# Patient Record
Sex: FEMALE | Race: WHITE | Employment: OTHER | URBAN - METROPOLITAN AREA
[De-identification: names, ages, dates, MRNs, and addresses within clinical notes are randomized per-mention and may not be internally consistent; named-entity substitution may affect disease eponyms.]

---

## 2017-01-09 ENCOUNTER — HOSPITAL ENCOUNTER (OUTPATIENT)
Dept: MAMMOGRAPHY | Facility: HOSPITAL | Age: 67
Discharge: HOME | End: 2017-01-09
Attending: FAMILY MEDICINE
Payer: MEDICARE

## 2017-01-09 DIAGNOSIS — R92.8 ABNORMAL MAMMOGRAM: ICD-10-CM

## 2017-01-09 PROCEDURE — 77065 DX MAMMO INCL CAD UNI: CPT

## 2017-04-10 RX ORDER — DICYCLOMINE HYDROCHLORIDE 10 MG/1
CAPSULE ORAL
Qty: 60 CAPSULE | Refills: 0 | Status: SHIPPED | OUTPATIENT
Start: 2017-04-10 | End: 2017-11-05

## 2017-04-10 NOTE — TELEPHONE ENCOUNTER
GI RN staff: Please contact the patient. I have refilled the prescription ×1. She should be seen in the office in follow-up for further refills.

## 2017-04-10 NOTE — TELEPHONE ENCOUNTER
From: Walt Armenta  To:  Ange Jacques MD  Sent: 4/10/2017 1:40 PM CDT  Subject: Medication Renewal Request    Original authorizing provider: MD Walt Worthington would like a refill of the following medications:  Dicyclomin

## 2017-04-11 ENCOUNTER — HOSPITAL ENCOUNTER (OUTPATIENT)
Dept: MAMMOGRAPHY | Facility: HOSPITAL | Age: 67
Discharge: HOME | End: 2017-04-11
Attending: FAMILY MEDICINE
Payer: MEDICARE

## 2017-04-11 DIAGNOSIS — R92.8 ABNORMAL MAMMOGRAM: ICD-10-CM

## 2017-04-11 PROCEDURE — 77065 DX MAMMO INCL CAD UNI: CPT | Performed by: RADIOLOGY

## 2017-04-11 NOTE — TELEPHONE ENCOUNTER
[facility] [employer] - Medicare Part D - [id number]  [phone] formulary exception    They are faxing us form now    Pt messaged via [facility] to call office to schedule appt

## 2017-04-14 NOTE — TELEPHONE ENCOUNTER
Received approval fax from [facility]. Effective 1/13/17-4/13/18  [id number]    Approval fax sent for scanning.  Pharmacy notified of approval.

## 2017-04-26 ENCOUNTER — HOSPITAL ENCOUNTER (OUTPATIENT)
Dept: GENERAL RADIOLOGY | Facility: HOSPITAL | Age: 67
Discharge: HOME | End: 2017-04-26
Attending: FAMILY MEDICINE
Payer: MEDICARE

## 2017-04-26 DIAGNOSIS — M25.551 RIGHT HIP PAIN: ICD-10-CM

## 2017-04-26 PROCEDURE — 73502 X-RAY EXAM HIP UNI 2-3 VIEWS: CPT

## 2017-05-15 ENCOUNTER — TELEPHONE (OUTPATIENT)
Dept: GASTROENTEROLOGY | Facility: CLINIC | Age: 67
End: 2017-05-15

## 2017-05-15 ENCOUNTER — OFFICE VISIT (OUTPATIENT)
Dept: GASTROENTEROLOGY | Facility: CLINIC | Age: 67
End: 2017-05-15

## 2017-05-15 VITALS
BODY MASS INDEX: 22.71 KG/M2 | SYSTOLIC BLOOD PRESSURE: 120 MMHG | HEIGHT: 64 IN | HEART RATE: 67 BPM | DIASTOLIC BLOOD PRESSURE: 70 MMHG | WEIGHT: 133 LBS

## 2017-05-15 DIAGNOSIS — K21.9 GASTROESOPHAGEAL REFLUX DISEASE WITHOUT ESOPHAGITIS: Primary | ICD-10-CM

## 2017-05-15 PROCEDURE — G0463 HOSPITAL OUTPT CLINIC VISIT: HCPCS | Performed by: INTERNAL MEDICINE

## 2017-05-15 PROCEDURE — 99213 OFFICE O/P EST LOW 20 MIN: CPT | Performed by: INTERNAL MEDICINE

## 2017-05-15 RX ORDER — CYCLOSPORINE 0.5 MG/ML
EMULSION OPHTHALMIC
Refills: 2 | COMMUNITY
Start: 2017-04-05 | End: 2019-09-25 | Stop reason: ALTCHOICE

## 2017-05-15 RX ORDER — ATORVASTATIN CALCIUM 10 MG/1
TABLET, FILM COATED ORAL
Refills: 3 | COMMUNITY
Start: 2017-05-06 | End: 2021-01-20

## 2017-05-15 RX ORDER — VALACYCLOVIR HYDROCHLORIDE 500 MG/1
TABLET, FILM COATED ORAL
Refills: 3 | COMMUNITY
Start: 2017-03-15 | End: 2019-09-25 | Stop reason: ALTCHOICE

## 2017-05-15 NOTE — PROGRESS NOTES
HPI:    Patient ID: Diamond Castro is a 78year old female. HPI  Yessy Dupont was last seen in June 2015. As per previous notes she has a long-standing history of gastroesophageal reflux that had been controlled with Nexium.  In the spring of 2014 she presente helped with dicyclomine as needed. She has noted no bleeding. The patient is not taking calcium supplementation as she was told of the possibility of an increased risk of lung cancer. She is taking vitamin D and drinks almond milk daily.   She is due to exudate. Eyes: Conjunctivae are normal. No scleral icterus. Neck: Neck supple. No thyromegaly present. Cardiovascular: Normal rate, regular rhythm and normal heart sounds.     Pulmonary/Chest: Effort normal and breath sounds normal. No respiratory dis

## 2017-05-15 NOTE — TELEPHONE ENCOUNTER
Scheduled for:  Colonoscopy 44092  Provider Name:DR. CROWLEY  Date:  6-28-17  Location: [facility]    Sedation:  MAC SEDATION  Time:  1115 AM Lafabricece Asp 1015 AM  Prep: [place] PREP AND GATORADE  Meds/Allergies Reconciled?:  CODEINE  Diagnosis with codes:  FAMILY HX.OF C

## 2017-05-15 NOTE — PATIENT INSTRUCTIONS
1.  Schedule colonoscopy for a family history of colon cancer and a history of colon polyps following a MiraLAX/Gatorade preparation and monitored anesthesia care. 2.  Continue current regimen of Zegerid and famotidine (Pepcid AC) for reflux.     3.  Onel Brown

## 2017-06-14 ENCOUNTER — HOSPITAL ENCOUNTER (OUTPATIENT)
Dept: BONE DENSITY | Facility: HOSPITAL | Age: 67
Discharge: HOME | End: 2017-06-14
Attending: FAMILY MEDICINE
Payer: MEDICARE

## 2017-06-14 DIAGNOSIS — M81.0 OSTEOPOROSIS: ICD-10-CM

## 2017-06-14 PROCEDURE — 77080 DXA BONE DENSITY AXIAL: CPT | Performed by: FAMILY MEDICINE

## 2017-06-28 ENCOUNTER — HOSPITAL ENCOUNTER (OUTPATIENT)
Facility: HOSPITAL | Age: 67
Setting detail: HOSPITAL OUTPATIENT SURGERY
Discharge: HOME | End: 2017-06-28
Attending: INTERNAL MEDICINE | Admitting: INTERNAL MEDICINE
Payer: MEDICARE

## 2017-06-28 ENCOUNTER — SURGERY (OUTPATIENT)
Age: 67
End: 2017-06-28

## 2017-06-28 ENCOUNTER — ANESTHESIA EVENT (OUTPATIENT)
Dept: ENDOSCOPY | Facility: HOSPITAL | Age: 67
End: 2017-06-28
Payer: MEDICARE

## 2017-06-28 ENCOUNTER — ANESTHESIA (OUTPATIENT)
Dept: ENDOSCOPY | Facility: HOSPITAL | Age: 67
End: 2017-06-28
Payer: MEDICARE

## 2017-06-28 DIAGNOSIS — K57.90 DIVERTICULOSIS: Primary | ICD-10-CM

## 2017-06-28 PROCEDURE — 0DJD8ZZ INSPECTION OF LOWER INTESTINAL TRACT, VIA NATURAL OR ARTIFICIAL OPENING ENDOSCOPIC: ICD-10-PCS | Performed by: INTERNAL MEDICINE

## 2017-06-28 RX ORDER — LIDOCAINE HYDROCHLORIDE 10 MG/ML
INJECTION, SOLUTION EPIDURAL; INFILTRATION; INTRACAUDAL; PERINEURAL AS NEEDED
Status: DISCONTINUED | OUTPATIENT
Start: 2017-06-28 | End: 2017-06-28 | Stop reason: SURG

## 2017-06-28 RX ORDER — SODIUM CHLORIDE, SODIUM LACTATE, POTASSIUM CHLORIDE, CALCIUM CHLORIDE 600; 310; 30; 20 MG/100ML; MG/100ML; MG/100ML; MG/100ML
INJECTION, SOLUTION INTRAVENOUS CONTINUOUS PRN
Status: DISCONTINUED | OUTPATIENT
Start: 2017-06-28 | End: 2017-06-28 | Stop reason: SURG

## 2017-06-28 RX ORDER — SODIUM CHLORIDE, SODIUM LACTATE, POTASSIUM CHLORIDE, CALCIUM CHLORIDE 600; 310; 30; 20 MG/100ML; MG/100ML; MG/100ML; MG/100ML
INJECTION, SOLUTION INTRAVENOUS CONTINUOUS
Status: DISCONTINUED | OUTPATIENT
Start: 2017-06-28 | End: 2017-06-28

## 2017-06-28 RX ORDER — NALOXONE HYDROCHLORIDE 0.4 MG/ML
80 INJECTION, SOLUTION INTRAMUSCULAR; INTRAVENOUS; SUBCUTANEOUS AS NEEDED
Status: DISCONTINUED | OUTPATIENT
Start: 2017-06-28 | End: 2017-06-28

## 2017-06-28 RX ORDER — ONDANSETRON 2 MG/ML
4 INJECTION INTRAMUSCULAR; INTRAVENOUS ONCE AS NEEDED
Status: DISCONTINUED | OUTPATIENT
Start: 2017-06-28 | End: 2017-06-28

## 2017-06-28 RX ADMIN — LIDOCAINE HYDROCHLORIDE 50 MG: 10 INJECTION, SOLUTION EPIDURAL; INFILTRATION; INTRACAUDAL; PERINEURAL at 11:48:00

## 2017-06-28 RX ADMIN — SODIUM CHLORIDE, SODIUM LACTATE, POTASSIUM CHLORIDE, CALCIUM CHLORIDE: 600; 310; 30; 20 INJECTION, SOLUTION INTRAVENOUS at 11:45:00

## 2017-06-28 RX ADMIN — SODIUM CHLORIDE, SODIUM LACTATE, POTASSIUM CHLORIDE, CALCIUM CHLORIDE: 600; 310; 30; 20 INJECTION, SOLUTION INTRAVENOUS at 12:26:00

## 2017-06-28 NOTE — OPERATIVE REPORT
[facility] Endoscopy Report      Date of Procedure:  06/28/17      Preoperative Diagnosis:  1. Personal history of adenomatous colon polyps  2.   Family history of colon cancer      Postoperative Diagnosis:  Colonic diverticulosis      Proc colonic polyps, mass lesions, vascular anomalies or signs of inflammation seen. Retroflexion in the rectum revealed no abnormalities. The procedure was well tolerated without immediate complication. Impression:  1.   Colonic diverticulosis as describ

## 2017-06-28 NOTE — ANESTHESIA PREPROCEDURE EVALUATION
Anesthesia PreOp Note    HPI:     Matthews Schaumann is a 78year old female who presents for preoperative consultation requested by: Rola Prince MD    Date of Surgery: 6/28/2017    Procedure(s):  COLONOSCOPY  Indication: Family history of colon can MG Oral Tab Take 20 mg by mouth nightly as needed for Heartburn (take 2-20 mg nightly).  Disp:  Rfl:  6/27/2017   Cyanocobalamin 3000 MCG Oral Cap NERVIDOX (unknown strength) Disp:  Rfl:  6/26/2017   Rosuvastatin Calcium (CRESTOR) 5 MG Oral Tab Take 2.5 mg No history of anesthetic complications   Airway   Mallampati: I  TM distance: >3 FB  Neck ROM: full  Dental - normal exam     Pulmonary - negative ROS and normal exam   Cardiovascular - negative ROS and normal exam    Neuro/Psych - negative ROS     GI/Hepa

## 2017-06-28 NOTE — H&P
History & Physical Examination    Patient Name: Sanchez Pickett  MRN: [id number]  CSN: [id number]  YOB: 1950    Diagnosis: Personal history of adenomatous polyps and family history of colon cancer        Prescriptions Prior to Admission:  PEG HISTORY      Comment: per NG \"skin from vulva precancerous\"-please                specify  2014: UPPER GI ENDOSCOPY,BIOPSY  Family History   Problem Relation Age of Onset   • Heart Disease Father    • Colon Cancer Mother    • Hypertension Mother    • Can

## 2017-06-28 NOTE — ANESTHESIA POSTPROCEDURE EVALUATION
Patient:  Marilee Cho    Procedure Summary     Date:  06/28/17 Room / Location:  [address] [place] [address] ENDOSCOPY 01 / [address] [place] [address] ENDOSCOPY    Anesthesia Start:  2585 Anesthesia Stop:      Procedure:  COLONOSCOPY (N/A ) Diagnosis:       Family history of colon cancer      [place]

## 2017-06-29 VITALS
OXYGEN SATURATION: 100 % | SYSTOLIC BLOOD PRESSURE: 144 MMHG | HEIGHT: 63 IN | BODY MASS INDEX: 23.74 KG/M2 | RESPIRATION RATE: 21 BRPM | DIASTOLIC BLOOD PRESSURE: 66 MMHG | HEART RATE: 59 BPM | WEIGHT: 134 LBS

## 2017-10-24 ENCOUNTER — HOSPITAL ENCOUNTER (OUTPATIENT)
Dept: MAMMOGRAPHY | Facility: HOSPITAL | Age: 67
Discharge: HOME | End: 2017-10-24
Attending: FAMILY MEDICINE
Payer: MEDICARE

## 2017-10-24 DIAGNOSIS — R92.8 ABNORMAL MAMMOGRAM: ICD-10-CM

## 2017-10-24 PROCEDURE — 77066 DX MAMMO INCL CAD BI: CPT | Performed by: FAMILY MEDICINE

## 2017-11-06 RX ORDER — DICYCLOMINE HYDROCHLORIDE 10 MG/1
CAPSULE ORAL
Qty: 60 CAPSULE | Refills: 0 | Status: SHIPPED | OUTPATIENT
Start: 2017-11-06 | End: 2018-02-22

## 2017-11-06 NOTE — TELEPHONE ENCOUNTER
Pending Prescriptions Disp Refills    DICYCLOMINE HCL 10 MG Oral Cap [Pharmacy Med Name: DICYCLOMINE 10MG CAPSULES] 60 capsule 0     Sig: TAKE 1 TO 2 CAPSULES BY MOUTH FOUR TIMES DAILY BEFORE MEALS AND AT BEDTIME AS NEEDED         See refill request  Carli Rodarte

## 2018-02-23 NOTE — TELEPHONE ENCOUNTER
From: Estefany Cameron  Sent: 2/22/2018 6:50 PM CST  Subject: Medication Renewal Request    Fidelina Parker would like a refill of the following medications:     DICYCLOMINE HCL 10 MG Oral Cap Rajni Trujillo, APN]   Patient Comment: I have a formulary exc

## 2018-02-23 NOTE — TELEPHONE ENCOUNTER
Pending Prescriptions Disp Refills    Dicyclomine HCl 10 MG Oral Cap 60 capsule 0       See refill request  Patient last seen 5-15-17.    Medication last refilled 11-6-17

## 2018-02-24 RX ORDER — DICYCLOMINE HYDROCHLORIDE 10 MG/1
10 CAPSULE ORAL 4 TIMES DAILY PRN
Qty: 60 CAPSULE | Refills: 1 | Status: SHIPPED
Start: 2018-02-24 | End: 2019-01-13

## 2018-08-06 ENCOUNTER — TELEPHONE (OUTPATIENT)
Dept: GASTROENTEROLOGY | Facility: CLINIC | Age: 68
End: 2018-08-06

## 2018-08-06 NOTE — TELEPHONE ENCOUNTER
Current Outpatient Prescriptions:  Dicyclomine HCl 10 MG Oral Cap Take 1 capsule (10 mg total) by mouth 4 (four) times daily as needed.  Disp: 60 capsule Rfl: 1     PA request call [phone] Pt ID# [id number]

## 2018-08-07 NOTE — TELEPHONE ENCOUNTER
Submitted PA through CoverMyMeds with Key: [id number]    Should get a determination from [employer] within 3-5 days.

## 2018-08-09 NOTE — TELEPHONE ENCOUNTER
Contacted [facility] at [phone], option 3, and spoke to Makenzie. Provided her with the additional information needed and she stated that they will fax over the determination when finished.

## 2018-08-09 NOTE — TELEPHONE ENCOUNTER
malvin- from  [address] [place] [address] [place]  Option 3    Reference number [id number]     [facility] Requesting additional information needs to verify diagnoses of pt.        Current Outpatient Prescriptions:   •  Dicyclomine HCl 10 MG Oral Cap, Take 1 capsule (10 mg total)

## 2018-08-13 NOTE — TELEPHONE ENCOUNTER
Nursing: per Dr. Jeferson Richardson last OV note, pt is on Bentyl PRN for fecal urgency for which she has been stable/responding well for the last year.  She does not have over diarrhea or constipation and as such the below Medicare provided recommendations wou

## 2018-08-13 NOTE — TELEPHONE ENCOUNTER
Received fax from [employer]. The PA for dicyclomine was denied. Denial reason is \"Medicare rules require you must have a FDA approved diagnosis. ..  The recommended drugs covered by your plan may include: Xifaxan, loperamide, polyethylene glycol

## 2018-08-15 NOTE — TELEPHONE ENCOUNTER
We will go ahead and start the appeal process. [facility] - could you please write a letter that I can submit along with the appeal paperwork? Thank you.

## 2018-08-20 NOTE — TELEPHONE ENCOUNTER
Faxed denial letter, letter from [employer], and last OV note to [facility] at [phone] to start appeal.    Should hear a response within 7 days.

## 2018-09-05 NOTE — TELEPHONE ENCOUNTER
Approval for Dicycl[facility] 10 [facility] Member ID #[id number]  Certification #[id number]    Sent to Scanning    Called [facility] to notify them it as approved    Patient notified as well.

## 2018-10-31 ENCOUNTER — HOSPITAL ENCOUNTER (OUTPATIENT)
Dept: MAMMOGRAPHY | Facility: HOSPITAL | Age: 68
Discharge: HOME | End: 2018-10-31
Attending: FAMILY MEDICINE
Payer: MEDICARE

## 2018-10-31 DIAGNOSIS — Z12.39 ENCOUNTER FOR SCREENING FOR MALIGNANT NEOPLASM OF BREAST: ICD-10-CM

## 2018-10-31 PROCEDURE — 77067 SCR MAMMO BI INCL CAD: CPT | Performed by: FAMILY MEDICINE

## 2018-10-31 PROCEDURE — 77063 BREAST TOMOSYNTHESIS BI: CPT | Performed by: FAMILY MEDICINE

## 2019-01-03 ENCOUNTER — HOSPITAL ENCOUNTER (OUTPATIENT)
Dept: ULTRASOUND IMAGING | Facility: HOSPITAL | Age: 69
Discharge: HOME | End: 2019-01-03
Attending: FAMILY MEDICINE
Payer: MEDICARE

## 2019-01-03 DIAGNOSIS — R92.2 DENSE BREAST: ICD-10-CM

## 2019-01-03 PROCEDURE — 76641 ULTRASOUND BREAST COMPLETE: CPT | Performed by: FAMILY MEDICINE

## 2019-01-13 ENCOUNTER — PATIENT MESSAGE (OUTPATIENT)
Dept: GASTROENTEROLOGY | Facility: CLINIC | Age: 69
End: 2019-01-13

## 2019-01-14 RX ORDER — DICYCLOMINE HYDROCHLORIDE 10 MG/1
10 CAPSULE ORAL 4 TIMES DAILY PRN
Qty: 60 CAPSULE | Refills: 1 | Status: SHIPPED | OUTPATIENT
Start: 2019-01-14 | End: 2019-03-11

## 2019-01-14 NOTE — TELEPHONE ENCOUNTER
From: Johnathan Connolly  To: Rah Garcia MD  Sent: 1/13/2019 3:32 PM CST  Subject: Prescription Question    I have sent a request for a refill of Dicylomine 10mg to [facility]. My last refill was in August. I still take it as needed. Thank you.   Bertram Webster

## 2019-01-14 NOTE — TELEPHONE ENCOUNTER
Requested Prescriptions     Pending Prescriptions Disp Refills   • Dicyclomine HCl 10 MG Oral Cap 60 capsule 1     Sig: Take 1 capsule (10 mg total) by mouth 4 (four) times daily as needed.      LOV-6/28/17  LR-2/22/18

## 2019-01-14 NOTE — TELEPHONE ENCOUNTER
Nursing: Patient has not been seen in office and >1-year. Would recommend a nonurgent annual follow-up with either myself or Dr. Vanessa Vázquez per patient preference. Rx filled.

## 2019-01-16 NOTE — TELEPHONE ENCOUNTER
Spoke to pt to get schedule.  Scheduled for 1/28 with [facility]

## 2019-02-18 NOTE — PROGRESS NOTES
[facility] Follow-up Visit    Celsa Etoh  - Denies illicit drug use   - LMP: Postmenopausal  - Occupation: Retired  -    - NSAIDs/ASA use: PRN    History, Medications, Allergies, ROS:      Past Medical History:   Diagnosis Date   • Dysplasia of vagina 2015    surgery done   • Esophag mg by mouth nightly as needed for Heartburn (take 2-20 mg nightly). Disp:  Rfl:    Cyanocobalamin 3000 MCG Oral Cap NERVIDOX (unknown strength) Disp:  Rfl:    Coenzyme Q10 (CO Q-10) 200 MG Oral Cap Take  by mouth.  Disp:  Rfl:    Cholecalciferol (VITAMIN D- movements of all 4 extremities   Psych: Pt has a normal mood and affect, behavior is normal    Nursing note and vitals reviewed      Labs/Imaging:     Patient's labs and imaging were reviewed and discussed with patient today.   See HPI and A&P for further d

## 2019-03-11 ENCOUNTER — OFFICE VISIT (OUTPATIENT)
Dept: GASTROENTEROLOGY | Facility: CLINIC | Age: 69
End: 2019-03-11
Payer: MEDICARE

## 2019-03-11 VITALS
HEART RATE: 67 BPM | SYSTOLIC BLOOD PRESSURE: 115 MMHG | BODY MASS INDEX: 21.7 KG/M2 | WEIGHT: 124 LBS | DIASTOLIC BLOOD PRESSURE: 73 MMHG | HEIGHT: 63.5 IN

## 2019-03-11 DIAGNOSIS — K21.9 GASTROESOPHAGEAL REFLUX DISEASE, ESOPHAGITIS PRESENCE NOT SPECIFIED: ICD-10-CM

## 2019-03-11 DIAGNOSIS — R10.9 ABDOMINAL CRAMPING: Primary | ICD-10-CM

## 2019-03-11 PROCEDURE — 99213 OFFICE O/P EST LOW 20 MIN: CPT | Performed by: NURSE PRACTITIONER

## 2019-03-11 PROCEDURE — G0463 HOSPITAL OUTPT CLINIC VISIT: HCPCS | Performed by: NURSE PRACTITIONER

## 2019-03-11 RX ORDER — OMEPRAZOLE/SODIUM BICARBONATE 20MG-1.1G
20 CAPSULE ORAL
COMMUNITY

## 2019-03-11 RX ORDER — DICYCLOMINE HYDROCHLORIDE 10 MG/1
10 CAPSULE ORAL 4 TIMES DAILY PRN
Qty: 60 CAPSULE | Refills: 1 | Status: SHIPPED | OUTPATIENT
Start: 2019-03-11 | End: 2019-08-14

## 2019-03-11 NOTE — PATIENT INSTRUCTIONS
1.  Continue Bentyl as needed  2. Continue omeprazole/Pepcid as needed for heartburn  3.   Follow-up in 1 year or sooner if new issues arise

## 2019-08-15 RX ORDER — DICYCLOMINE HYDROCHLORIDE 10 MG/1
CAPSULE ORAL
Qty: 60 CAPSULE | Refills: 0 | Status: SHIPPED | OUTPATIENT
Start: 2019-08-15 | End: 2019-11-06

## 2019-08-15 NOTE — TELEPHONE ENCOUNTER
Requested Prescriptions     Pending Prescriptions Disp Refills   • DICYCLOMINE HCL 10 MG Oral Cap [Pharmacy Med Name: DICYCLOMINE 10MG CAPSULES] 60 capsule 0     Sig: TAKE 1 CAPSULE(10 MG) BY MOUTH FOUR TIMES DAILY AS NEEDED     lov-3/11/19  lr-3/11/19

## 2019-09-18 NOTE — PROGRESS NOTES
[facility] Follow-up Visit    Celsa breath.           Pertinent Surgical Hx:  Cholecystectomy  - See additional surgical hx below  - No known issues with sedation.  - No known history of sleep apnea.     Pertinent Family Hx:  + Colon cancer, mother (diagnosed age 80)  - No family hx of esopha (Other) Paternal Grandfather    • Ovarian Cancer Paternal Aunt 71      Social History: Social History    Tobacco Use      Smoking status: Never Smoker      Smokeless tobacco: Never Used    Alcohol use: Yes      Comment: 2 glasses of wine monthly    Drug us swelling  BEHAVIOR/PSYCH:  negative for depressed mood    PHYSICAL EXAM:   Blood pressure 144/88, pulse 69, temperature 98.2 °F (36.8 °C), temperature source Oral, resp. rate 20, height 5' 3.5\" (1.613 m), weight 129 lb (58.5 kg).     Gen: patient appears c possible the patient's symptoms last month may have been viral in origin and follow-up symptoms could be a postinfectious irritable bowel.   Pending lab findings, consideration for cautious observation, change in probiotic to a non-dairy-containing product,

## 2019-09-25 ENCOUNTER — OFFICE VISIT (OUTPATIENT)
Dept: GASTROENTEROLOGY | Facility: CLINIC | Age: 69
End: 2019-09-25
Payer: MEDICARE

## 2019-09-25 VITALS
WEIGHT: 129 LBS | DIASTOLIC BLOOD PRESSURE: 88 MMHG | SYSTOLIC BLOOD PRESSURE: 144 MMHG | HEART RATE: 69 BPM | TEMPERATURE: 98 F | RESPIRATION RATE: 20 BRPM | HEIGHT: 63.5 IN | BODY MASS INDEX: 22.57 KG/M2

## 2019-09-25 DIAGNOSIS — R19.4 ALTERED BOWEL HABITS: Primary | ICD-10-CM

## 2019-09-25 DIAGNOSIS — K21.9 GASTROESOPHAGEAL REFLUX DISEASE, ESOPHAGITIS PRESENCE NOT SPECIFIED: ICD-10-CM

## 2019-09-25 PROCEDURE — G0463 HOSPITAL OUTPT CLINIC VISIT: HCPCS | Performed by: NURSE PRACTITIONER

## 2019-09-25 PROCEDURE — 99214 OFFICE O/P EST MOD 30 MIN: CPT | Performed by: NURSE PRACTITIONER

## 2019-09-25 NOTE — PATIENT INSTRUCTIONS
1.  Complete stool testing-C. difficile testing can only be done on loose/diarrheal stools  2. Consider changing probiotic to a non-milk-containing product  3. Consider a fiber supplement and continue bland diet  4. Increase omeprazole to twice daily.

## 2019-10-09 ENCOUNTER — LAB ENCOUNTER (OUTPATIENT)
Dept: LAB | Facility: HOSPITAL | Age: 69
End: 2019-10-09
Attending: NURSE PRACTITIONER
Payer: MEDICARE

## 2019-10-09 DIAGNOSIS — R19.4 ALTERED BOWEL HABITS: ICD-10-CM

## 2019-10-09 PROCEDURE — 87329 GIARDIA AG IA: CPT

## 2019-10-09 PROCEDURE — 87077 CULTURE AEROBIC IDENTIFY: CPT

## 2019-10-09 PROCEDURE — 87493 C DIFF AMPLIFIED PROBE: CPT

## 2019-10-09 PROCEDURE — 87427 SHIGA-LIKE TOXIN AG IA: CPT

## 2019-10-09 PROCEDURE — 87045 FECES CULTURE AEROBIC BACT: CPT

## 2019-10-09 PROCEDURE — 87046 STOOL CULTR AEROBIC BACT EA: CPT

## 2019-10-09 PROCEDURE — 87272 CRYPTOSPORIDIUM AG IF: CPT

## 2019-11-06 RX ORDER — DICYCLOMINE HYDROCHLORIDE 10 MG/1
CAPSULE ORAL
Qty: 60 CAPSULE | Refills: 2 | Status: SHIPPED | OUTPATIENT
Start: 2019-11-06 | End: 2020-06-08

## 2019-11-06 NOTE — TELEPHONE ENCOUNTER
Nursing: Per my last office visit note we planned for short-term office follow-up to rediscuss the patient's symptomatic response and adjust medications at that time. I would recommend a nonurgent follow-up. Rx has been sent.

## 2019-11-13 ENCOUNTER — HOSPITAL ENCOUNTER (OUTPATIENT)
Dept: MAMMOGRAPHY | Facility: HOSPITAL | Age: 69
Discharge: HOME | End: 2019-11-13
Attending: FAMILY MEDICINE
Payer: MEDICARE

## 2019-11-13 ENCOUNTER — TELEPHONE (OUTPATIENT)
Dept: GASTROENTEROLOGY | Facility: CLINIC | Age: 69
End: 2019-11-13

## 2019-11-13 DIAGNOSIS — Z12.31 ENCOUNTER FOR SCREENING MAMMOGRAM FOR MALIGNANT NEOPLASM OF BREAST: ICD-10-CM

## 2019-11-13 PROCEDURE — 77063 BREAST TOMOSYNTHESIS BI: CPT | Performed by: FAMILY MEDICINE

## 2019-11-13 PROCEDURE — 77067 SCR MAMMO BI INCL CAD: CPT | Performed by: FAMILY MEDICINE

## 2019-11-13 NOTE — TELEPHONE ENCOUNTER
Medication PA Requested: Dicyclomine 10 mg caps  Pt insurance/number to contact: [email or web address] [phone]  CoverMyMeds Used:  Yes  Key: [address] ID# and group: [id number]  Dx.: Abdominal cramping R10.9  Medications tried previously with callie

## 2019-11-13 NOTE — TELEPHONE ENCOUNTER
Current Outpatient Medications   Medication Sig Dispense Refill   • DICYCLOMINE HCL 10 MG Oral Cap TAKE 1 CAPSULE(10 MG) BY MOUTH FOUR TIMES DAILY AS NEEDED 60 capsule 2     Per pharmacy - plan does not cover this med.   Please call plan at [phone] to

## 2019-12-02 NOTE — PROGRESS NOTES
[facility] Follow-up Visit    Celsa Stathopoulos with MAC related to family history of colon cancer, history of adenomatous colon polyps, findings: Colonic diverticulosis, recommendations: Based on prior history of 2 subcentimeter adenomatous/no colon polyps on endoscopy with a difficult: Re Medications   Medication Sig Dispense Refill   • Lactobacillus-Inulin (Klutch[employer] DIGESTIVE HEALTH) Oral Cap      • DICYCLOMINE HCL 10 MG Oral Cap TAKE 1 CAPSULE(10 MG) BY MOUTH FOUR TIMES DAILY AS NEEDED 60 capsule 2   • Omeprazole-Sodium Bicarbonate (ZEG appear moist  CV: regular rate and rhythm, the extremities are warm and well perfused   Lung: effort normal and breath sounds normal, no respiratory distress, wheezes or rales  GI: soft, non-tender exam in all quadrants without rigidity or guarding, non-di This Visit:  Requested Prescriptions      No prescriptions requested or ordered in this encounter       Imaging & Referrals:  None       BIANCA Garza  12/9/2019

## 2019-12-09 ENCOUNTER — OFFICE VISIT (OUTPATIENT)
Dept: GASTROENTEROLOGY | Facility: CLINIC | Age: 69
End: 2019-12-09
Payer: MEDICARE

## 2019-12-09 VITALS
WEIGHT: 128 LBS | DIASTOLIC BLOOD PRESSURE: 69 MMHG | SYSTOLIC BLOOD PRESSURE: 114 MMHG | BODY MASS INDEX: 22.4 KG/M2 | HEART RATE: 73 BPM | HEIGHT: 63.5 IN

## 2019-12-09 DIAGNOSIS — R19.4 ALTERED BOWEL HABITS: Primary | ICD-10-CM

## 2019-12-09 DIAGNOSIS — K21.9 GASTROESOPHAGEAL REFLUX DISEASE, ESOPHAGITIS PRESENCE NOT SPECIFIED: ICD-10-CM

## 2019-12-09 PROCEDURE — 99213 OFFICE O/P EST LOW 20 MIN: CPT | Performed by: NURSE PRACTITIONER

## 2019-12-09 PROCEDURE — G0463 HOSPITAL OUTPT CLINIC VISIT: HCPCS | Performed by: NURSE PRACTITIONER

## 2019-12-09 RX ORDER — LACTOBACILLUS RHAMNOSUS GG 10B CELL
CAPSULE ORAL
COMMUNITY
Start: 2019-11-01

## 2019-12-09 NOTE — PATIENT INSTRUCTIONS
1.  Continue Zegerid twice daily then cut back to daily  2. Continue famotidine nightly  3.   Follow-up annually or sooner if new issues arise

## 2020-05-07 ENCOUNTER — OFFICE VISIT (OUTPATIENT)
Dept: GASTROENTEROLOGY | Facility: CLINIC | Age: 70
End: 2020-05-07
Payer: MEDICARE

## 2020-05-07 ENCOUNTER — APPOINTMENT (OUTPATIENT)
Dept: LAB | Facility: HOSPITAL | Age: 70
End: 2020-05-07
Attending: INTERNAL MEDICINE
Payer: MEDICARE

## 2020-05-07 VITALS
DIASTOLIC BLOOD PRESSURE: 83 MMHG | HEIGHT: 63.5 IN | HEART RATE: 62 BPM | BODY MASS INDEX: 22.23 KG/M2 | SYSTOLIC BLOOD PRESSURE: 130 MMHG | WEIGHT: 127 LBS

## 2020-05-07 DIAGNOSIS — R19.7 DIARRHEA, UNSPECIFIED TYPE: Primary | ICD-10-CM

## 2020-05-07 DIAGNOSIS — R19.7 DIARRHEA, UNSPECIFIED TYPE: ICD-10-CM

## 2020-05-07 DIAGNOSIS — K58.0 IRRITABLE BOWEL SYNDROME WITH DIARRHEA: ICD-10-CM

## 2020-05-07 PROCEDURE — 99213 OFFICE O/P EST LOW 20 MIN: CPT | Performed by: INTERNAL MEDICINE

## 2020-05-07 PROCEDURE — 36415 COLL VENOUS BLD VENIPUNCTURE: CPT

## 2020-05-07 PROCEDURE — 83516 IMMUNOASSAY NONANTIBODY: CPT

## 2020-05-07 PROCEDURE — G0463 HOSPITAL OUTPT CLINIC VISIT: HCPCS | Performed by: INTERNAL MEDICINE

## 2020-05-07 PROCEDURE — 82784 ASSAY IGA/IGD/IGG/IGM EACH: CPT

## 2020-05-07 NOTE — PATIENT INSTRUCTIONS
1.  Resume Culturelle probiotic. 2.  Obtain blood work for celiac disease  3. Further advice pending the above results.

## 2020-05-07 NOTE — PROGRESS NOTES
HPI:    Patient ID: Sesar Wolfe is a 78year old female. HPI  The patient returns in follow-up today to discuss chronic intermittent diarrhea.   She was last seen by myself at the time of colonoscopy in June 2017 and most recently by BIANCA Song in D CAPSULE(10 MG) BY MOUTH FOUR TIMES DAILY AS NEEDED 60 capsule 2   • Omeprazole-Sodium Bicarbonate (ZEGERID)  MG Oral Cap Take 20 mg by mouth 2 (two) times daily.        • Zolpidem Tartrate (AMBIEN) 5 MG Oral Tab Only when traveling   0   • famoTIDine and vitals reviewed. ASSESSMENT/PLAN:   Diarrhea, unspecified type  (primary encounter diagnosis)  Irritable bowel syndrome with diarrhea  The patient has a longstanding history of intermittent diarrhea described as urgent and looser stools.   I

## 2020-06-08 ENCOUNTER — TELEPHONE (OUTPATIENT)
Dept: GASTROENTEROLOGY | Facility: CLINIC | Age: 70
End: 2020-06-08

## 2020-06-08 RX ORDER — DICYCLOMINE HYDROCHLORIDE 10 MG/1
CAPSULE ORAL
Qty: 60 CAPSULE | Refills: 2 | Status: SHIPPED | OUTPATIENT
Start: 2020-06-08 | End: 2020-12-14

## 2020-06-08 NOTE — TELEPHONE ENCOUNTER
Requested Prescriptions     Pending Prescriptions Disp Refills   • DICYCLOMINE HCL 10 MG Oral Cap [Pharmacy Med Name: DICYCLOMINE 10MG CAPSULES] 60 capsule 2     Sig: TAKE 1 CAPSULE(10 MG) BY MOUTH FOUR TIMES DAILY AS NEEDED     LOV: 5/7/2020 with Dr. Wendy Arndt

## 2020-06-09 RX ORDER — NORTRIPTYLINE HYDROCHLORIDE 10 MG/1
10 CAPSULE ORAL NIGHTLY
Qty: 30 CAPSULE | Refills: 2 | Status: SHIPPED | OUTPATIENT
Start: 2020-06-09 | End: 2020-09-03

## 2020-06-10 NOTE — TELEPHONE ENCOUNTER
Dr. Nena Rogers for one month and has helped but has had 10x diarrhea in the last month. Patient is requesting you call her regarding next steps. Not willing to give RN further details.      If needed I can schedule a tele/video vis
I spoke to Troy Cabello. Symptoms as below. The symptoms are intermittent and the patient may have several days of no symptoms. Constipating agents such as Imodium or colestipol would likely be problematic. I am recommending a trial of a low-dose tricyclic.   I
Patient requesting to speak with Dr Jory Spencer to update him - states she is doing a little better but would like to discuss. Please call [phone]. Thank you.
Statement Selected

## 2020-09-03 ENCOUNTER — HOSPITAL ENCOUNTER (OUTPATIENT)
Dept: GENERAL RADIOLOGY | Facility: HOSPITAL | Age: 70
Discharge: HOME | End: 2020-09-03
Attending: FAMILY MEDICINE
Payer: MEDICARE

## 2020-09-03 DIAGNOSIS — M25.572 CHRONIC PAIN OF LEFT ANKLE: ICD-10-CM

## 2020-09-03 DIAGNOSIS — G89.29 CHRONIC PAIN OF LEFT ANKLE: ICD-10-CM

## 2020-09-03 DIAGNOSIS — G89.29 CHRONIC FOOT PAIN, LEFT: ICD-10-CM

## 2020-09-03 DIAGNOSIS — M79.672 CHRONIC FOOT PAIN, LEFT: ICD-10-CM

## 2020-09-03 PROCEDURE — 73610 X-RAY EXAM OF ANKLE: CPT | Performed by: FAMILY MEDICINE

## 2020-09-03 PROCEDURE — 73630 X-RAY EXAM OF FOOT: CPT | Performed by: FAMILY MEDICINE

## 2020-09-04 RX ORDER — NORTRIPTYLINE HYDROCHLORIDE 10 MG/1
10 CAPSULE ORAL NIGHTLY
Qty: 30 CAPSULE | Refills: 2 | Status: SHIPPED | OUTPATIENT
Start: 2020-09-04 | End: 2021-11-02

## 2020-10-09 ENCOUNTER — TELEPHONE (OUTPATIENT)
Dept: GASTROENTEROLOGY | Facility: CLINIC | Age: 70
End: 2020-10-09

## 2020-10-09 NOTE — TELEPHONE ENCOUNTER
Ludivina Fisher    Received letter from Jennifer Mariee about potential drug safety concern\" multiple anticholinergic medications. Placed on your desk for review.     Thank you

## 2020-10-12 NOTE — TELEPHONE ENCOUNTER
Documents are noted and appreciated. The patient may continue with nortriptyline and dicyclomine as prescribed. Nursing: Do we need to fill out any of this paperwork?   It appears that the only listed paperwork with signatures would be in the event if t

## 2020-10-12 NOTE — TELEPHONE ENCOUNTER
[address] office received this fax. It appears that it is just an informative fax that generates by the patient's pharmaceutical provider. There is no need to sign anything.   We are supposed to scan documents received about a patient under our care i

## 2020-11-17 ENCOUNTER — HOSPITAL ENCOUNTER (OUTPATIENT)
Dept: MAMMOGRAPHY | Facility: HOSPITAL | Age: 70
Discharge: HOME | End: 2020-11-17
Attending: FAMILY MEDICINE
Payer: MEDICARE

## 2020-11-17 DIAGNOSIS — Z12.31 ENCOUNTER FOR SCREENING MAMMOGRAM FOR HIGH-RISK PATIENT: ICD-10-CM

## 2020-11-17 PROCEDURE — 77067 SCR MAMMO BI INCL CAD: CPT | Performed by: FAMILY MEDICINE

## 2020-11-17 PROCEDURE — 77063 BREAST TOMOSYNTHESIS BI: CPT | Performed by: FAMILY MEDICINE

## 2020-11-25 RX ORDER — NORTRIPTYLINE HYDROCHLORIDE 10 MG/1
10 CAPSULE ORAL NIGHTLY
Qty: 90 CAPSULE | Refills: 1 | Status: SHIPPED
Start: 2020-11-25 | End: 2021-01-05

## 2020-11-25 NOTE — TELEPHONE ENCOUNTER
Current Outpatient Medications   Medication Sig Dispense Refill   • Nortriptyline HCl 10 MG Oral Cap Take 1 capsule (10 mg total) by mouth nightly.  30 capsule 2

## 2020-11-25 NOTE — TELEPHONE ENCOUNTER
Dr. Stanislaw Meyer    Patient requesting refill of below medication. Please review and sign below pended prescription of appropriate.     Thank you    LOV 5/7/2020  LR 9/4/2020    Nortriptyline HCl 10 MG Oral Cap 30 capsule 2 9/4/2020    Sig:   Take 1 capsul

## 2020-12-02 NOTE — PROGRESS NOTES
[facility] Follow-up Visit    Celsa observation vs alternative screening unless new symptoms or signs are noted     June 2014 EGD performed by Dr. Deacon Miramontes with IV Twilight related to reflux, findings: Fundic gland polyps, mild erythema     Social Hx:  - No smoking  + Occasional Etoh HCl 10 MG Oral Cap Take 1 capsule (10 mg total) by mouth nightly. 90 capsule 1   • Nortriptyline HCl 10 MG Oral Cap Take 1 capsule (10 mg total) by mouth nightly. 30 capsule 2   • ZINC OR Use as directed in the mouth or throat once a week.      • Lactobacil appears anicteric, oropharynx clear, mucus membranes appear moist  CV: regular rate and rhythm, the extremities are warm and well perfused   Lung: effort normal and breath sounds normal, no respiratory distress, wheezes or rales  GI: soft, non-tender exam reasonable. Plan for follow-up in 1 year or sooner if new issues arise.       Recommend:  -Continue Zegerid OTC daily  -Continue nortriptyline 10 mg nightly  -Continue dicyclomine as needed  -Follow-up in 1 year or sooner if new issues arise      Orders Th

## 2020-12-09 PROCEDURE — 87624 HPV HI-RISK TYP POOLED RSLT: CPT | Performed by: CLINICAL MEDICAL LABORATORY

## 2020-12-09 PROCEDURE — 88175 CYTOPATH C/V AUTO FLUID REDO: CPT | Performed by: CLINICAL MEDICAL LABORATORY

## 2020-12-09 PROCEDURE — 88112 CYTOPATH CELL ENHANCE TECH: CPT | Performed by: CLINICAL MEDICAL LABORATORY

## 2020-12-10 ENCOUNTER — LAB REQUISITION (OUTPATIENT)
Dept: LAB | Age: 70
End: 2020-12-10

## 2020-12-10 DIAGNOSIS — Z12.4 ENCOUNTER FOR SCREENING FOR MALIGNANT NEOPLASM OF CERVIX: ICD-10-CM

## 2020-12-10 DIAGNOSIS — D07.1 CARCINOMA IN SITU OF VULVA: ICD-10-CM

## 2020-12-14 ENCOUNTER — OFFICE VISIT (OUTPATIENT)
Dept: GASTROENTEROLOGY | Facility: CLINIC | Age: 70
End: 2020-12-14
Payer: MEDICARE

## 2020-12-14 VITALS
WEIGHT: 130 LBS | HEIGHT: 63 IN | BODY MASS INDEX: 23.04 KG/M2 | HEART RATE: 77 BPM | SYSTOLIC BLOOD PRESSURE: 110 MMHG | DIASTOLIC BLOOD PRESSURE: 72 MMHG

## 2020-12-14 DIAGNOSIS — K58.0 IRRITABLE BOWEL SYNDROME WITH DIARRHEA: ICD-10-CM

## 2020-12-14 DIAGNOSIS — K21.9 GASTROESOPHAGEAL REFLUX DISEASE, UNSPECIFIED WHETHER ESOPHAGITIS PRESENT: Primary | ICD-10-CM

## 2020-12-14 LAB
ASR DISCLAIMER: NORMAL
CASE RPRT: NORMAL
CLINICAL INFO: NORMAL
PATH REPORT.FINAL DX SPEC: NORMAL
PATH REPORT.GROSS SPEC: NORMAL

## 2020-12-14 PROCEDURE — G0463 HOSPITAL OUTPT CLINIC VISIT: HCPCS | Performed by: NURSE PRACTITIONER

## 2020-12-14 PROCEDURE — 99214 OFFICE O/P EST MOD 30 MIN: CPT | Performed by: NURSE PRACTITIONER

## 2020-12-14 RX ORDER — OMEPRAZOLE AND SODIUM BICARBONATE 40; 1100 MG/1; MG/1
1 CAPSULE ORAL
Qty: 90 CAPSULE | Refills: 0 | Status: SHIPPED | OUTPATIENT
Start: 2020-12-14 | End: 2021-01-20

## 2020-12-14 RX ORDER — DICYCLOMINE HYDROCHLORIDE 10 MG/1
10 CAPSULE ORAL 4 TIMES DAILY PRN
Qty: 60 CAPSULE | Refills: 2 | Status: SHIPPED | OUTPATIENT
Start: 2020-12-14 | End: 2021-12-15

## 2020-12-14 NOTE — PATIENT INSTRUCTIONS
-Continue Zegerid OTC daily  -Continue nortriptyline 10 mg nightly  -Continue dicyclomine as needed  -Follow-up in 1 year or sooner if new issues arise

## 2020-12-15 LAB
CASE RPRT: NORMAL
CYTOLOGY CVX/VAG STUDY: NORMAL
CYTOLOGY CVX/VAG STUDY: NORMAL
HPV16+18+45 E6+E7MRNA CVX NAA+PROBE: NEGATIVE
Lab: NORMAL
PAP EDUCATIONAL NOTE: NORMAL
SPECIMEN ADEQUACY: NORMAL

## 2020-12-16 LAB
HPV16+18+45 E6+E7MRNA CVX NAA+PROBE: NEGATIVE
Lab: NORMAL
SERVICE CMNT-IMP: NORMAL

## 2020-12-18 ENCOUNTER — TELEPHONE (OUTPATIENT)
Dept: GASTROENTEROLOGY | Facility: CLINIC | Age: 70
End: 2020-12-18

## 2020-12-21 ENCOUNTER — TELEPHONE (OUTPATIENT)
Dept: GASTROENTEROLOGY | Facility: CLINIC | Age: 70
End: 2020-12-21

## 2020-12-21 ENCOUNTER — PATIENT MESSAGE (OUTPATIENT)
Dept: GASTROENTEROLOGY | Facility: CLINIC | Age: 70
End: 2020-12-21

## 2020-12-21 NOTE — TELEPHONE ENCOUNTER
Drug Change request      Drug:  Omeprazol 40mg/SOD BICARB 1100 mg  Qty:90    Sig:  Take 1 Capsule by mouth every morning before breakfast  No current outpatient medications on file.

## 2020-12-22 ENCOUNTER — TELEPHONE (OUTPATIENT)
Dept: GASTROENTEROLOGY | Facility: CLINIC | Age: 70
End: 2020-12-22

## 2020-12-22 NOTE — TELEPHONE ENCOUNTER
If the symptoms are controlled with the omeprazole alone, we do not need to add sodium bicarbonate. If the patient wishes to continue to use Zegerid, may use the goodRx coupon.

## 2020-12-22 NOTE — TELEPHONE ENCOUNTER
From: Traci Camilo  To: BAILEY Balbuena  Sent: 12/21/2020 11:53 AM CST  Subject: Prescription Question    Temi,  When I saw you on the 14th we talked about refills on Dicyclomine 10mg and one for Zegrid which I can no longer find over the counter

## 2020-12-22 NOTE — TELEPHONE ENCOUNTER
Dr. Tejinder White    Patient trying to fill zegrid prescription since she has trouble finding it OTC since the pandemic. It is very expensive.   The pharmacist recommended splitting up the components for a cheaper price (take 20mg omeprazole and 1100mg of s

## 2020-12-22 NOTE — TELEPHONE ENCOUNTER
Per pharmacist, they have no information on this request.  Patient never seen by our GI doctors. Message disregarded.

## 2020-12-22 NOTE — TELEPHONE ENCOUNTER
Medication PA Requested: Dicyclomine HCl 10 MG Oral Cap                   CoverMyMeds Used:  Yes  Key:[id number]  Sig: Take 1 capsule (10 mg total) by mouth 4 (four) times daily as needed  DX Code: IBS-D                                       PA submitted with

## 2020-12-22 NOTE — TELEPHONE ENCOUNTER
PA request for Zegrid submitted through Covermymeds. com  Await response. Rony Patel (Adeline Mulligan)    Your information has been submitted to [employer]. [employer] is reviewing the PA request and you will receive an electronic response.  You may c

## 2020-12-22 NOTE — TELEPHONE ENCOUNTER
Pharmacist states patient's insurance does not cover combination drug Omeprazole and Sodium Bicarb. The will only cover if written in two separate prescriptions or plain Omeprazole. Please advise. Thank you.

## 2020-12-23 NOTE — TELEPHONE ENCOUNTER
Patient contacted and message from Dr. Wandy Orta given. Patient states she feels the Becker Chuck works better for her and would like to stay on it. She will try using the GoodRx coupon at the pharmacy.

## 2020-12-23 NOTE — TELEPHONE ENCOUNTER
Per TERESA alvares request is not reviewable by Jennifer Mariee. This is the number listed below ([employer] pharmacy help desk) that we received by pharmacy.      Contacted [employer] help desk at [phone] and spoke with [employer]

## 2020-12-23 NOTE — TELEPHONE ENCOUNTER
Our prior authorization request for David Aguilar has been denied-received fax from [employer].   \" Patient must have tried and failed 2 other drugs   -esomeprazole  -lansoprazole  -omeprazole  -pantoprazole\"     Per below documentation from [facility] pat

## 2020-12-23 NOTE — TELEPHONE ENCOUNTER
Heather Andrade    I spoke with the pharmacist at [facility] aware dicyclomine is authorized and will fill. The Zegerid 40-1100mg will not work with [employer] coupon. The coupon is only good for Zegerid (the otc dose) 20-1100mg.   Please advise on alternate prescr

## 2020-12-28 NOTE — TELEPHONE ENCOUNTER
Nursing: May disregard my below message. There is a separate MyChart message related to the same topic.

## 2020-12-28 NOTE — TELEPHONE ENCOUNTER
Nursing: Please contact the patient with the below information relating insurance company for the omeprazole-sodium bicarbonate. I believe she was attempting to obtain this medication over-the-counter as well so she may not need this prescription.   If she

## 2021-01-05 ENCOUNTER — IMMUNIZATION (OUTPATIENT)
Dept: LAB | Facility: HOSPITAL | Age: 71
End: 2021-01-05
Attending: PREVENTIVE MEDICINE
Payer: MEDICARE

## 2021-01-05 DIAGNOSIS — Z23 NEED FOR VACCINATION: ICD-10-CM

## 2021-01-05 PROCEDURE — 0011A SARSCOV2 VAC 100MCG/0.5ML IM: CPT

## 2021-01-05 RX ORDER — NORTRIPTYLINE HYDROCHLORIDE 10 MG/1
10 CAPSULE ORAL NIGHTLY
Qty: 90 CAPSULE | Refills: 3 | Status: SHIPPED | OUTPATIENT
Start: 2021-01-05 | End: 2021-11-15

## 2021-01-05 NOTE — TELEPHONE ENCOUNTER
Requested Prescriptions     Pending Prescriptions Disp Refills   • Nortriptyline HCl 10 MG Oral Cap 90 capsule 1     Sig: Take 1 capsule (10 mg total) by mouth nightly.      lov-5/7/20  lr-11/25/20

## 2021-01-06 NOTE — TELEPHONE ENCOUNTER
Contacted [employer] help desk at [phone] and spoke with [facility]. Dicyclomine approved through 8/22/2020-12/21/2021. Contacted [facility] to be sure that patient has been able to receive prescription. Patient picked up 30 day supply 12/26/2020.

## 2021-01-18 ENCOUNTER — OFFICE VISIT (OUTPATIENT)
Dept: GASTROENTEROLOGY | Facility: CLINIC | Age: 71
End: 2021-01-18
Payer: MEDICARE

## 2021-01-18 ENCOUNTER — TELEPHONE (OUTPATIENT)
Dept: GASTROENTEROLOGY | Facility: CLINIC | Age: 71
End: 2021-01-18

## 2021-01-18 VITALS
TEMPERATURE: 97 F | SYSTOLIC BLOOD PRESSURE: 120 MMHG | HEIGHT: 63 IN | HEART RATE: 73 BPM | DIASTOLIC BLOOD PRESSURE: 67 MMHG | WEIGHT: 128 LBS | BODY MASS INDEX: 22.68 KG/M2

## 2021-01-18 DIAGNOSIS — K21.9 GASTROESOPHAGEAL REFLUX DISEASE WITHOUT ESOPHAGITIS: ICD-10-CM

## 2021-01-18 DIAGNOSIS — K21.9 GASTROESOPHAGEAL REFLUX DISEASE WITHOUT ESOPHAGITIS: Primary | ICD-10-CM

## 2021-01-18 DIAGNOSIS — K31.7 GASTRIC POLYPS: ICD-10-CM

## 2021-01-18 DIAGNOSIS — Z87.19 HISTORY OF GASTRIC POLYP: Primary | ICD-10-CM

## 2021-01-18 PROCEDURE — 99213 OFFICE O/P EST LOW 20 MIN: CPT | Performed by: INTERNAL MEDICINE

## 2021-01-18 RX ORDER — PREDNISOLONE ACETATE 10 MG/ML
SUSPENSION/ DROPS OPHTHALMIC
COMMUNITY
Start: 2020-11-04 | End: 2021-01-20

## 2021-01-18 NOTE — TELEPHONE ENCOUNTER
I called and left a voicemail message for patient to onel me back and letting me know if Tues 3/09/2021 @ 11:30 is acceptable for EGD.     Transfer to ext [phone]

## 2021-01-18 NOTE — TELEPHONE ENCOUNTER
Scheduled for:  [facility] [id number]  Provider Name: Dr Rico Yancey   Date: [place] 3/09/2021   Location: [facility]  Sedation: MAC   Time: 11:30 am   Prep:  Nothing after midnight the day before procedure and NPO 4 hrs prior procedure    Meds/Allergies Reconciled?:  Reviewed

## 2021-01-18 NOTE — PATIENT INSTRUCTIONS
Schedule upper endoscopy for history of gastroesophageal reflux and gastric polyps with monitored anesthesia care

## 2021-01-20 ENCOUNTER — LAB ENCOUNTER (OUTPATIENT)
Dept: LAB | Age: 71
End: 2021-01-20
Attending: FAMILY MEDICINE
Payer: MEDICARE

## 2021-01-20 ENCOUNTER — OFFICE VISIT (OUTPATIENT)
Dept: FAMILY MEDICINE CLINIC | Facility: CLINIC | Age: 71
End: 2021-01-20
Payer: MEDICARE

## 2021-01-20 VITALS
WEIGHT: 126.81 LBS | HEART RATE: 69 BPM | TEMPERATURE: 97 F | SYSTOLIC BLOOD PRESSURE: 107 MMHG | HEIGHT: 63 IN | DIASTOLIC BLOOD PRESSURE: 71 MMHG | BODY MASS INDEX: 22.47 KG/M2

## 2021-01-20 DIAGNOSIS — Z86.010 HISTORY OF COLON POLYPS: ICD-10-CM

## 2021-01-20 DIAGNOSIS — R79.9 ABNORMAL BLOOD CHEMISTRY: ICD-10-CM

## 2021-01-20 DIAGNOSIS — E78.2 MIXED HYPERLIPIDEMIA: Primary | ICD-10-CM

## 2021-01-20 DIAGNOSIS — K21.9 GASTROESOPHAGEAL REFLUX DISEASE WITHOUT ESOPHAGITIS: ICD-10-CM

## 2021-01-20 DIAGNOSIS — R53.83 FATIGUE, UNSPECIFIED TYPE: ICD-10-CM

## 2021-01-20 DIAGNOSIS — M81.0 OSTEOPOROSIS, UNSPECIFIED OSTEOPOROSIS TYPE, UNSPECIFIED PATHOLOGICAL FRACTURE PRESENCE: ICD-10-CM

## 2021-01-20 DIAGNOSIS — E78.2 MIXED HYPERLIPIDEMIA: ICD-10-CM

## 2021-01-20 LAB
ALBUMIN SERPL-MCNC: 3.8 G/DL (ref 3.4–5)
ALBUMIN/GLOB SERPL: 1 {RATIO} (ref 1–2)
ALP LIVER SERPL-CCNC: 116 U/L
ALT SERPL-CCNC: 27 U/L
ANION GAP SERPL CALC-SCNC: 7 MMOL/L (ref 0–18)
AST SERPL-CCNC: 19 U/L (ref 15–37)
BILIRUB SERPL-MCNC: 0.6 MG/DL (ref 0.1–2)
BUN BLD-MCNC: 11 MG/DL (ref 7–18)
BUN/CREAT SERPL: 13.1 (ref 10–20)
CALCIUM BLD-MCNC: 9.4 MG/DL (ref 8.5–10.1)
CHLORIDE SERPL-SCNC: 105 MMOL/L (ref 98–112)
CHOLEST SMN-MCNC: 146 MG/DL (ref ?–200)
CO2 SERPL-SCNC: 30 MMOL/L (ref 21–32)
CREAT BLD-MCNC: 0.84 MG/DL
DEPRECATED RDW RBC AUTO: 43.3 FL (ref 35.1–46.3)
ERYTHROCYTE [DISTWIDTH] IN BLOOD BY AUTOMATED COUNT: 12.4 % (ref 11–15)
GLOBULIN PLAS-MCNC: 4 G/DL (ref 2.8–4.4)
GLUCOSE BLD-MCNC: 81 MG/DL (ref 70–99)
HCT VFR BLD AUTO: 41.3 %
HDLC SERPL-MCNC: 59 MG/DL (ref 40–59)
HGB BLD-MCNC: 13.4 G/DL
LDLC SERPL CALC-MCNC: 66 MG/DL (ref ?–100)
M PROTEIN MFR SERPL ELPH: 7.8 G/DL (ref 6.4–8.2)
MCH RBC QN AUTO: 31.2 PG (ref 26–34)
MCHC RBC AUTO-ENTMCNC: 32.4 G/DL (ref 31–37)
MCV RBC AUTO: 96.3 FL
NONHDLC SERPL-MCNC: 87 MG/DL (ref ?–130)
OSMOLALITY SERPL CALC.SUM OF ELEC: 292 MOSM/KG (ref 275–295)
PATIENT FASTING Y/N/NP: YES
PATIENT FASTING Y/N/NP: YES
PLATELET # BLD AUTO: 270 10(3)UL (ref 150–450)
POTASSIUM SERPL-SCNC: 4.4 MMOL/L (ref 3.5–5.1)
RBC # BLD AUTO: 4.29 X10(6)UL
SODIUM SERPL-SCNC: 142 MMOL/L (ref 136–145)
TRIGL SERPL-MCNC: 105 MG/DL (ref 30–149)
TSI SER-ACNC: 2.52 MIU/ML (ref 0.36–3.74)
VLDLC SERPL CALC-MCNC: 21 MG/DL (ref 0–30)
WBC # BLD AUTO: 5.7 X10(3) UL (ref 4–11)

## 2021-01-20 PROCEDURE — 80061 LIPID PANEL: CPT

## 2021-01-20 PROCEDURE — 99203 OFFICE O/P NEW LOW 30 MIN: CPT | Performed by: FAMILY MEDICINE

## 2021-01-20 PROCEDURE — 80053 COMPREHEN METABOLIC PANEL: CPT

## 2021-01-20 PROCEDURE — 36415 COLL VENOUS BLD VENIPUNCTURE: CPT

## 2021-01-20 PROCEDURE — 85027 COMPLETE CBC AUTOMATED: CPT

## 2021-01-20 PROCEDURE — 84443 ASSAY THYROID STIM HORMONE: CPT

## 2021-01-20 NOTE — PROGRESS NOTES
HPI:    Patient ID: Traci Camilo is a 69year old female. HPI  David Canchola returns in follow-up. She was last seen by BIANCA Balbuena on December 14, 2020 and by myself in May 2020.     As per previous notes David Canchola has a very longstanding history of inter 10 mg dose at 8 PM each night.     The patient received her first dose of the Moderna coronavirus vaccine earlier this month as she is a volunteer at the hospital.       Review of Systems  See above    Wt Readings from Last 6 Encounters:  01/20/21 : 126 lb No scleral icterus. Neck: Neck supple. No thyromegaly present. Cardiovascular: Normal rate, regular rhythm and normal heart sounds. Pulmonary/Chest: Effort normal and breath sounds normal. No respiratory distress. She has no wheezes.  She has no rales

## 2021-01-20 NOTE — PROGRESS NOTES
Johnathan Connolly is a 69year old female. Patient presents with:  Establish Care: former patient of Dr. Darrell Li      HPI:   Patient is a 65-year-old female who presents today to establish care.   Patient has been receiving care every 6 months with her old 2      Past Medical History:   Diagnosis Date   • Dysplasia of vagina 2015    surgery done   • Esophageal reflux    • High cholesterol    • Osteoporosis    • Other and unspecified hyperlipidemia       Past Surgical History:   Procedure Laterality Date   • tolerating it well. - COMP METABOLIC PANEL (14); Future    3. Fatigue, unspecified type  We will draw TSH today. - ASSAY, THYROID STIM HORMONE; Future    4.  History of colon polyps  Patient to have an evaluation with GI in March for whether she needs a c

## 2021-02-02 ENCOUNTER — IMMUNIZATION (OUTPATIENT)
Dept: LAB | Facility: HOSPITAL | Age: 71
End: 2021-02-02
Attending: PREVENTIVE MEDICINE
Payer: MEDICARE

## 2021-02-02 DIAGNOSIS — Z23 NEED FOR VACCINATION: Primary | ICD-10-CM

## 2021-02-02 PROCEDURE — 0012A SARSCOV2 VAC 100MCG/0.5ML IM: CPT

## 2021-02-04 NOTE — TELEPHONE ENCOUNTER

## 2021-03-01 ENCOUNTER — PATIENT MESSAGE (OUTPATIENT)
Dept: GASTROENTEROLOGY | Facility: CLINIC | Age: 71
End: 2021-03-01

## 2021-03-02 NOTE — TELEPHONE ENCOUNTER
From: Kayla Sy  To: James Avalos MD  Sent: 3/1/2021 5:31 PM CST  Subject: Other    I am having an EGD on Tuesday March 9. I know I need to have a covid test I think 72 hrs prior to the EGD.  Would you send me the details on when and where I n

## 2021-03-06 ENCOUNTER — LAB ENCOUNTER (OUTPATIENT)
Dept: LAB | Facility: HOSPITAL | Age: 71
End: 2021-03-06
Attending: INTERNAL MEDICINE
Payer: MEDICARE

## 2021-03-06 DIAGNOSIS — Z01.818 PRE-OP TESTING: ICD-10-CM

## 2021-03-07 LAB — SARS-COV-2 RNA RESP QL NAA+PROBE: NOT DETECTED

## 2021-03-09 ENCOUNTER — ANESTHESIA EVENT (OUTPATIENT)
Dept: ENDOSCOPY | Facility: HOSPITAL | Age: 71
End: 2021-03-09
Payer: MEDICARE

## 2021-03-09 ENCOUNTER — HOSPITAL ENCOUNTER (OUTPATIENT)
Facility: HOSPITAL | Age: 71
Setting detail: HOSPITAL OUTPATIENT SURGERY
Discharge: HOME | End: 2021-03-09
Attending: INTERNAL MEDICINE | Admitting: INTERNAL MEDICINE
Payer: MEDICARE

## 2021-03-09 ENCOUNTER — ANESTHESIA (OUTPATIENT)
Dept: ENDOSCOPY | Facility: HOSPITAL | Age: 71
End: 2021-03-09
Payer: MEDICARE

## 2021-03-09 VITALS
RESPIRATION RATE: 20 BRPM | OXYGEN SATURATION: 98 % | BODY MASS INDEX: 22.68 KG/M2 | SYSTOLIC BLOOD PRESSURE: 147 MMHG | WEIGHT: 128 LBS | TEMPERATURE: 98 F | DIASTOLIC BLOOD PRESSURE: 70 MMHG | HEIGHT: 63 IN | HEART RATE: 66 BPM

## 2021-03-09 DIAGNOSIS — K21.9 GASTROESOPHAGEAL REFLUX DISEASE WITHOUT ESOPHAGITIS: ICD-10-CM

## 2021-03-09 DIAGNOSIS — Z87.19 HISTORY OF GASTRIC POLYP: ICD-10-CM

## 2021-03-09 DIAGNOSIS — Z01.818 PRE-OP TESTING: Primary | ICD-10-CM

## 2021-03-09 PROCEDURE — 43251 EGD REMOVE LESION SNARE: CPT | Performed by: INTERNAL MEDICINE

## 2021-03-09 PROCEDURE — 0DB68ZX EXCISION OF STOMACH, VIA NATURAL OR ARTIFICIAL OPENING ENDOSCOPIC, DIAGNOSTIC: ICD-10-PCS | Performed by: INTERNAL MEDICINE

## 2021-03-09 RX ORDER — LIDOCAINE HYDROCHLORIDE 10 MG/ML
INJECTION, SOLUTION EPIDURAL; INFILTRATION; INTRACAUDAL; PERINEURAL AS NEEDED
Status: DISCONTINUED | OUTPATIENT
Start: 2021-03-09 | End: 2021-03-09 | Stop reason: SURG

## 2021-03-09 RX ORDER — ONDANSETRON 2 MG/ML
4 INJECTION INTRAMUSCULAR; INTRAVENOUS ONCE
Status: COMPLETED | OUTPATIENT
Start: 2021-03-09 | End: 2021-03-09

## 2021-03-09 RX ORDER — SODIUM CHLORIDE, SODIUM LACTATE, POTASSIUM CHLORIDE, CALCIUM CHLORIDE 600; 310; 30; 20 MG/100ML; MG/100ML; MG/100ML; MG/100ML
INJECTION, SOLUTION INTRAVENOUS CONTINUOUS
Status: DISCONTINUED | OUTPATIENT
Start: 2021-03-09 | End: 2021-03-09

## 2021-03-09 RX ADMIN — LIDOCAINE HYDROCHLORIDE 25 MG: 10 INJECTION, SOLUTION EPIDURAL; INFILTRATION; INTRACAUDAL; PERINEURAL at 12:48:00

## 2021-03-09 RX ADMIN — SODIUM CHLORIDE, SODIUM LACTATE, POTASSIUM CHLORIDE, CALCIUM CHLORIDE: 600; 310; 30; 20 INJECTION, SOLUTION INTRAVENOUS at 13:44:00

## 2021-03-09 NOTE — H&P
History & Physical Examination    Patient Name: Jackie May  MRN: [id number]  [facility]: [id number]  YOB: 1950    Diagnosis: Gastroesophageal reflux/dyspepsia      Dicyclomine HCl 10 MG Oral Cap, Take 1 capsule (10 mg total) by mouth 4 (four) ti unspecified hyperlipidemia      Past Surgical History:   Procedure Laterality Date   • CATARACT Left 02/21/2019    implant put in per pt @ [facility]   • CATARACT Right 10/17/2019   • CHOLECYSTECTOMY  2011   • COLONOSCOPY  2012   • COLONOSCOPY N/A 6/28/2

## 2021-03-09 NOTE — OPERATIVE REPORT
[facility] Endoscopy Report      Date of Procedure:  03/09/21        Preoperative Diagnosis:  Gastroesophageal reflux and dyspepsia      Postoperative Diagnosis:  1. Multiple gastric polyps  2.  Hiatal hernia      Procedure:    Esophagogastr suction or with the aid of a retrieval net. All areas exhibited hemostasis with the exception of #1 polypectomy site which exhibited oozing of blood. This was easily controlled with the application of a solitary endoscopic clip.   The duodenal bulb and post

## 2021-03-09 NOTE — ANESTHESIA POSTPROCEDURE EVALUATION
Patient:  Lucia Kohler    Procedure Summary     Date: 03/09/21 Room / Location: [facility] ENDOSCOPY 04 / [facility] ENDOSCOPY    Anesthesia Start: 7057 Anesthesia Stop: 1555    Procedure: ESOPHAGOGASTRODUODENOSCOPY (EGD) (N/A ) Diagnosis:       History of gastric neha

## 2021-03-09 NOTE — ANESTHESIA PREPROCEDURE EVALUATION
Anesthesia PreOp Note    HPI:     Jerry Selby is a 69year old female who presents for preoperative consultation requested by: Chapincito Mendosa MD    Date of Surgery: 3/9/2021    Procedure(s):  ESOPHAGOGASTRODUODENOSCOPY (EGD)  Indication: History throat once a week., Disp: , Rfl: , 2/28/2021  Lactobacillus-Inulin ([address] [place] [address]) Oral Cap, , Disp: , Rfl: , 2/28/2021  Omeprazole-Sodium Bicarbonate (ZEGERID)  MG Oral Cap, Take 20 mg by mouth BID (Nitrates).   , Disp: , Rfl: , 3/8/2 level: Not on file    Occupational History      Not on file    Tobacco Use      Smoking status: Never Smoker      Smokeless tobacco: Never Used    Vaping Use      Vaping Use: Never used    Substance and Sexual Activity      Alcohol use: Yes        Comment: MCH 31.2 01/20/2021    MCHC 32.4 01/20/2021    RDW 12.4 01/20/2021    .0 01/20/2021     Lab Results   Component Value Date     01/20/2021    K 4.4 01/20/2021     01/20/2021    CO2 30.0 01/20/2021    BUN 11 01/20/2021    CREATSERUM 0.84 0

## 2021-03-12 ENCOUNTER — TELEPHONE (OUTPATIENT)
Dept: GASTROENTEROLOGY | Facility: CLINIC | Age: 71
End: 2021-03-12

## 2021-03-12 NOTE — TELEPHONE ENCOUNTER
Recall EGD in 1 year per Dr. Chucky Shea. Last done: 3/9/21. Next due: 3/9/22    Updated health maintenance and pt outreach.

## 2021-03-12 NOTE — TELEPHONE ENCOUNTER
----- Message from Bryson Francisco MD sent at 3/11/2021  6:26 PM CST -----  I spoke to the patient. Other than a slight sore throat she is feeling well. The polyps were fundic gland in nature. No H. pylori.   The polyps, however, were numerous and s

## 2021-04-06 ENCOUNTER — PATIENT MESSAGE (OUTPATIENT)
Dept: GASTROENTEROLOGY | Facility: CLINIC | Age: 71
End: 2021-04-06

## 2021-04-06 ENCOUNTER — PATIENT MESSAGE (OUTPATIENT)
Dept: FAMILY MEDICINE CLINIC | Facility: CLINIC | Age: 71
End: 2021-04-06

## 2021-04-06 RX ORDER — ZOLPIDEM TARTRATE 5 MG/1
5 TABLET ORAL NIGHTLY PRN
Qty: 15 TABLET | Refills: 0 | Status: SHIPPED | OUTPATIENT
Start: 2021-04-06 | End: 2021-10-14

## 2021-04-06 NOTE — TELEPHONE ENCOUNTER
Please see [employer] message and advise if office visit is needed for request. LOV= 1/20/21; zolpidem 5 mg is historically listed on med list from 2015 [place] when traveling. \"

## 2021-04-07 NOTE — TELEPHONE ENCOUNTER
From: Star   To: Tracey aCde MD  Sent: 4/6/2021 12:50 PM CDT  Subject: Other    Dr. Schwartz Host,  After my EGD in early March, you suggested I try to take Zegerid every other day from every day.  I believe this is due to the number of ga

## 2021-04-08 NOTE — TELEPHONE ENCOUNTER
Dr. Garcia Letters-    Noted! I will relay this information to the patient & notify the schedulers to reach out to the patient, so that she may be scheduled for October as requested.      Would you like the same orders as placed last time?: \"Schedule upper endosco

## 2021-04-08 NOTE — TELEPHONE ENCOUNTER
Please contact William Gonzalez. Answers to questions as follows:    1. The Zegerid was indeed decreased in an effort to lessen polyp formation. Acid lowering medications can increase the development of fundic gland polyps. 2. An EGD in October would be fine.   Larkin Gaucher

## 2021-04-22 ENCOUNTER — TELEPHONE (OUTPATIENT)
Dept: GASTROENTEROLOGY | Facility: CLINIC | Age: 71
End: 2021-04-22

## 2021-04-22 DIAGNOSIS — K31.7 GASTRIC POLYPS: ICD-10-CM

## 2021-04-22 DIAGNOSIS — Z87.19 HISTORY OF GASTROESOPHAGEAL REFLUX (GERD): Primary | ICD-10-CM

## 2021-04-22 NOTE — TELEPHONE ENCOUNTER
Schedulers-    Please assist patient in scheduling EGD for October. Okay per Dr. Mario Genao. No OV needed. \"Schedule upper endoscopy for history of gastroesophageal reflux and gastric polyps with monitored anesthesia care. \"     Dx:Gastric polyps    Thank you!

## 2021-05-11 NOTE — TELEPHONE ENCOUNTER
Scheduled for:  EGD - 11674  Provider Name:  Dr. Claudell Public  Date:  10/5/21  Location:  [facility]  Sedation:  MAC  Time:  8:00 am (pt is aware to arrive at 7:00 am)  Prep:  NPO after midnight, Prep instructions were given to pt over the phone, pt verbalized un

## 2021-06-03 ENCOUNTER — TELEPHONE (OUTPATIENT)
Dept: OTHER | Facility: HOSPITAL | Age: 71
End: 2021-06-03

## 2021-06-03 ENCOUNTER — HOSPITAL ENCOUNTER (OUTPATIENT)
Dept: ULTRASOUND IMAGING | Facility: HOSPITAL | Age: 71
Discharge: HOME | End: 2021-06-03
Attending: FAMILY MEDICINE

## 2021-06-03 DIAGNOSIS — Z13.9 ENCOUNTER FOR SCREENING: ICD-10-CM

## 2021-06-03 NOTE — PROGRESS NOTES
Called back and pv results explained and risk factor management pt had received results via my chart and denied questions

## 2021-06-16 PROCEDURE — 87624 HPV HI-RISK TYP POOLED RSLT: CPT | Performed by: CLINICAL MEDICAL LABORATORY

## 2021-06-16 PROCEDURE — 88112 CYTOPATH CELL ENHANCE TECH: CPT | Performed by: CLINICAL MEDICAL LABORATORY

## 2021-06-17 ENCOUNTER — LAB REQUISITION (OUTPATIENT)
Dept: LAB | Age: 71
End: 2021-06-17

## 2021-06-17 DIAGNOSIS — D07.1 CARCINOMA IN SITU OF VULVA: ICD-10-CM

## 2021-06-17 LAB
HPV16+18+45 E6+E7MRNA CVX NAA+PROBE: NEGATIVE
Lab: NORMAL

## 2021-06-29 ENCOUNTER — OFFICE VISIT (OUTPATIENT)
Dept: FAMILY MEDICINE CLINIC | Facility: CLINIC | Age: 71
End: 2021-06-29
Payer: MEDICARE

## 2021-06-29 VITALS
HEIGHT: 63 IN | HEART RATE: 82 BPM | SYSTOLIC BLOOD PRESSURE: 126 MMHG | DIASTOLIC BLOOD PRESSURE: 82 MMHG | WEIGHT: 128.81 LBS | TEMPERATURE: 98 F | BODY MASS INDEX: 22.82 KG/M2

## 2021-06-29 DIAGNOSIS — E78.2 MIXED HYPERLIPIDEMIA: ICD-10-CM

## 2021-06-29 DIAGNOSIS — G89.29 CHRONIC RIGHT SHOULDER PAIN: Primary | ICD-10-CM

## 2021-06-29 DIAGNOSIS — M25.511 CHRONIC RIGHT SHOULDER PAIN: Primary | ICD-10-CM

## 2021-06-29 PROCEDURE — 99213 OFFICE O/P EST LOW 20 MIN: CPT | Performed by: FAMILY MEDICINE

## 2021-06-29 NOTE — PROGRESS NOTES
Darius Ramírez is a 69year old female.   Patient presents with:  Shoulder Pain: right shoulder pain x 3 months, hx of osteoporosis, Advil provides some relief  Injection: due for TDAP today      HPI:   Patient is a 71-year-old female who presents today wi Tartrate (AMBIEN) 5 MG Oral Tab Only when traveling  (Patient not taking: Reported on 6/29/2021 )  0      Past Medical History:   Diagnosis Date   • [facility]     1970 from birth control   • Dysplasia of vagina 2015    surgery done   • Eso without murmur    EXTREMITIES: no cyanosis, or edema    ASSESSMENT AND PLAN:   1. Chronic right shoulder pain  Suggested using a topical anti-inflammatory at night. Also suggested using Advil as needed with food.   Patient will return if this is not effect

## 2021-08-04 ENCOUNTER — HOSPITAL ENCOUNTER (OUTPATIENT)
Age: 71
Discharge: HOME | End: 2021-08-04
Payer: MEDICARE

## 2021-08-04 VITALS
DIASTOLIC BLOOD PRESSURE: 56 MMHG | TEMPERATURE: 99 F | SYSTOLIC BLOOD PRESSURE: 125 MMHG | OXYGEN SATURATION: 100 % | HEART RATE: 94 BPM | RESPIRATION RATE: 18 BRPM

## 2021-08-04 DIAGNOSIS — H60.11 CELLULITIS OF EAR, RIGHT: ICD-10-CM

## 2021-08-04 DIAGNOSIS — H93.8X1: Primary | ICD-10-CM

## 2021-08-04 PROCEDURE — 99203 OFFICE O/P NEW LOW 30 MIN: CPT | Performed by: NURSE PRACTITIONER

## 2021-08-04 RX ORDER — OFLOXACIN 3 MG/ML
10 SOLUTION AURICULAR (OTIC) DAILY
Qty: 5 ML | Refills: 0 | Status: SHIPPED | OUTPATIENT
Start: 2021-08-04 | End: 2021-08-11

## 2021-08-04 RX ORDER — AMOXICILLIN AND CLAVULANATE POTASSIUM 875; 125 MG/1; MG/1
1 TABLET, FILM COATED ORAL 2 TIMES DAILY
Qty: 14 TABLET | Refills: 0 | Status: SHIPPED | OUTPATIENT
Start: 2021-08-04 | End: 2021-08-11

## 2021-08-04 NOTE — ED PROVIDER NOTES
Patient Seen in: [facility]      History   Patient presents with:  Ear Problem Pain    Stated Complaint: ear pain    HPI/Subjective:   Well-appearing 69-year-old female presents with complaints of right upper ear and head pain since yesterda except as noted above.     Physical Exam     ED Triage Vitals [08/04/21 1821]   /56   Pulse 94   Resp 18   Temp 99.4 °F (37.4 °C)   Temp src    SpO2 100 %   O2 Device None (Room air)       Current:/56   Pulse 94   Temp 99.4 °F (37.4 °C)   Resp 1 pain.     I discussed with patient that symptoms are not likely to be related to the DTaP vaccine that she received yesterday on her left arm.     General education and instructions regarding early cellulitis as well as over-the-counter/nonpharmacological t

## 2021-08-04 NOTE — ED INITIAL ASSESSMENT (HPI)
Pt here with complaints of a sharp and stabbing pain to her upper right ear, pt staets she received her tdap shot yesterday and last night started having sharp pain to her upper right ear and right head, pt denies any nausea or dizziness

## 2021-08-05 ENCOUNTER — NURSE TRIAGE (OUTPATIENT)
Dept: FAMILY MEDICINE CLINIC | Facility: CLINIC | Age: 71
End: 2021-08-05

## 2021-08-05 NOTE — TELEPHONE ENCOUNTER
Spoke with pt,  verified  Pt stated she had Tetanus shot at [facility] 2 days ago then after her shot on that day she developed a stabbing pain on her RT ear, she was unable to sleep due to pain.   Pt takes tylenol as needed  Pt stated since her sx did not get

## 2021-08-05 NOTE — TELEPHONE ENCOUNTER
Spoke with patient ( verified) and relayed Dr. [facility] Larkin message below--patient verbalizes understanding and agreement. Appt scheduled for 1 p.m., Tuesday, 8/10/2021--will go to  if symptoms worsen prior to appt.  No further questions/concerns at this ti

## 2021-08-05 NOTE — TELEPHONE ENCOUNTER
I could squeeze her in next Tues anytime between1-2:30. Pls call pt and offer this to her.  If she needs to be seen sooner I can only offer UC

## 2021-08-05 NOTE — TELEPHONE ENCOUNTER
----- Message from [address] sent at 8/4/2021  3:47 PM CDT -----  Regarding: Non-Urgent Medical Question  Contact: [phone]  I received tdap (Boostrix) yesterday at [facility]. My arm was a little sore yesterday but nothing bad.  Later at night when I w

## 2021-08-10 ENCOUNTER — OFFICE VISIT (OUTPATIENT)
Dept: FAMILY MEDICINE CLINIC | Facility: CLINIC | Age: 71
End: 2021-08-10
Payer: MEDICARE

## 2021-08-10 VITALS
HEART RATE: 95 BPM | BODY MASS INDEX: 22.54 KG/M2 | WEIGHT: 127.19 LBS | HEIGHT: 63 IN | DIASTOLIC BLOOD PRESSURE: 78 MMHG | SYSTOLIC BLOOD PRESSURE: 136 MMHG

## 2021-08-10 DIAGNOSIS — Z12.31 ENCOUNTER FOR SCREENING MAMMOGRAM FOR MALIGNANT NEOPLASM OF BREAST: ICD-10-CM

## 2021-08-10 DIAGNOSIS — H60.501 ACUTE OTITIS EXTERNA OF RIGHT EAR, UNSPECIFIED TYPE: Primary | ICD-10-CM

## 2021-08-10 PROCEDURE — 99212 OFFICE O/P EST SF 10 MIN: CPT | Performed by: FAMILY MEDICINE

## 2021-08-10 NOTE — PROGRESS NOTES
Star  is a 69year old female.   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 Called mammography at the hospital to ensure that a screening mammogram would include a 2D and 3D mammogram.  They stated it does. - [facility] SCREENING BILAT (CPT=77067); Future    2.  Acute otitis externa of r

## 2021-10-02 ENCOUNTER — LAB ENCOUNTER (OUTPATIENT)
Dept: LAB | Facility: HOSPITAL | Age: 71
End: 2021-10-02
Attending: INTERNAL MEDICINE
Payer: MEDICARE

## 2021-10-02 DIAGNOSIS — Z01.818 PRE-OP TESTING: ICD-10-CM

## 2021-10-05 ENCOUNTER — ANESTHESIA EVENT (OUTPATIENT)
Dept: ENDOSCOPY | Facility: HOSPITAL | Age: 71
End: 2021-10-05
Payer: MEDICARE

## 2021-10-05 ENCOUNTER — ANESTHESIA (OUTPATIENT)
Dept: ENDOSCOPY | Facility: HOSPITAL | Age: 71
End: 2021-10-05
Payer: MEDICARE

## 2021-10-05 ENCOUNTER — HOSPITAL ENCOUNTER (OUTPATIENT)
Facility: HOSPITAL | Age: 71
Setting detail: HOSPITAL OUTPATIENT SURGERY
Discharge: HOME | End: 2021-10-05
Attending: INTERNAL MEDICINE | Admitting: INTERNAL MEDICINE
Payer: MEDICARE

## 2021-10-05 VITALS
HEART RATE: 65 BPM | TEMPERATURE: 98 F | OXYGEN SATURATION: 98 % | BODY MASS INDEX: 22.32 KG/M2 | WEIGHT: 126 LBS | HEIGHT: 63 IN | SYSTOLIC BLOOD PRESSURE: 146 MMHG | DIASTOLIC BLOOD PRESSURE: 70 MMHG | RESPIRATION RATE: 13 BRPM

## 2021-10-05 DIAGNOSIS — K31.7 GASTRIC POLYPS: ICD-10-CM

## 2021-10-05 DIAGNOSIS — Z87.19 HISTORY OF GASTROESOPHAGEAL REFLUX (GERD): ICD-10-CM

## 2021-10-05 DIAGNOSIS — Z01.818 PRE-OP TESTING: Primary | ICD-10-CM

## 2021-10-05 PROCEDURE — 43251 EGD REMOVE LESION SNARE: CPT | Performed by: INTERNAL MEDICINE

## 2021-10-05 PROCEDURE — 0DB68ZX EXCISION OF STOMACH, VIA NATURAL OR ARTIFICIAL OPENING ENDOSCOPIC, DIAGNOSTIC: ICD-10-PCS | Performed by: INTERNAL MEDICINE

## 2021-10-05 RX ORDER — SODIUM CHLORIDE, SODIUM LACTATE, POTASSIUM CHLORIDE, CALCIUM CHLORIDE 600; 310; 30; 20 MG/100ML; MG/100ML; MG/100ML; MG/100ML
INJECTION, SOLUTION INTRAVENOUS CONTINUOUS
Status: DISCONTINUED | OUTPATIENT
Start: 2021-10-05 | End: 2021-10-05

## 2021-10-05 RX ORDER — ONDANSETRON 2 MG/ML
INJECTION INTRAMUSCULAR; INTRAVENOUS AS NEEDED
Status: DISCONTINUED | OUTPATIENT
Start: 2021-10-05 | End: 2021-10-05 | Stop reason: SURG

## 2021-10-05 RX ORDER — LIDOCAINE HYDROCHLORIDE 10 MG/ML
INJECTION, SOLUTION EPIDURAL; INFILTRATION; INTRACAUDAL; PERINEURAL AS NEEDED
Status: DISCONTINUED | OUTPATIENT
Start: 2021-10-05 | End: 2021-10-05 | Stop reason: SURG

## 2021-10-05 RX ORDER — NALOXONE HYDROCHLORIDE 0.4 MG/ML
80 INJECTION, SOLUTION INTRAMUSCULAR; INTRAVENOUS; SUBCUTANEOUS AS NEEDED
Status: DISCONTINUED | OUTPATIENT
Start: 2021-10-05 | End: 2021-10-05

## 2021-10-05 RX ADMIN — SODIUM CHLORIDE, SODIUM LACTATE, POTASSIUM CHLORIDE, CALCIUM CHLORIDE: 600; 310; 30; 20 INJECTION, SOLUTION INTRAVENOUS at 08:57:00

## 2021-10-05 RX ADMIN — ONDANSETRON 4 MG: 2 INJECTION INTRAMUSCULAR; INTRAVENOUS at 08:15:00

## 2021-10-05 RX ADMIN — LIDOCAINE HYDROCHLORIDE 20 MG: 10 INJECTION, SOLUTION EPIDURAL; INFILTRATION; INTRACAUDAL; PERINEURAL at 08:04:00

## 2021-10-05 RX ADMIN — SODIUM CHLORIDE, SODIUM LACTATE, POTASSIUM CHLORIDE, CALCIUM CHLORIDE: 600; 310; 30; 20 INJECTION, SOLUTION INTRAVENOUS at 08:02:00

## 2021-10-05 NOTE — ANESTHESIA PREPROCEDURE EVALUATION
Anesthesia PreOp Note    HPI:     Diamond Castro is a 66year old female who presents for preoperative consultation requested by: Lesli Campbell MD    Date of Surgery: 10/5/2021    Procedure(s):  ESOPHAGOGASTRODUODENOSCOPY (EGD)  Indication: Histor mg total) by mouth 4 (four) times daily as needed. , Disp: 60 capsule, Rfl: 2, prn  Nortriptyline HCl 10 MG Oral Cap, Take 1 capsule (10 mg total) by mouth nightly., Disp: 30 capsule, Rfl: 2  ZINC OR, Use as directed in the mouth or throat once a week., Dis education: Not on file      Highest education level: Not on file    Occupational History      Not on file    Tobacco Use      Smoking status: Never Smoker      Smokeless tobacco: Never Used    Vaping Use      Vaping Use: Never used    Substance and Sexual Physically Abused: Not on file      Sexually Abused: Not on file  Housing Stability:       Unable to Pay for Housing in the Last Year: Not on file      Number of Places Lived in the Last Year: Not on file      Unstable Housing in the Last Year: Not on file

## 2021-10-05 NOTE — ANESTHESIA POSTPROCEDURE EVALUATION
Patient:  Darius [place]    Procedure Summary     Date: 10/05/21 Room / Location: [address] [place] [address] ENDOSCOPY 04 / [address] [place] [address] ENDOSCOPY    Anesthesia Start: 0802 Anesthesia Stop: 8457    Procedure: ESOPHAGOGASTRODUODENOSCOPY (EGD) (N/A ) Diagnosis:       History of gastroesoph

## 2021-10-05 NOTE — OPERATIVE REPORT
[facility] Endoscopy Report      Date of Procedure:  10/05/21        Preoperative Diagnosis:  1. Chronic gastroesophageal reflux  2. History of gastric polyps      Postoperative Diagnosis:  1. Multiple gastric polyps  2.   Hiatal hernia retrieval net. Hemostasis was spontaneous in most cases, however, there were #3 polypectomy sites that exhibited persistent oozing of bright red blood.   #1 of the sites was controlled with #3 hemostatic clips, a second with #2 hemostatic clips and a third

## 2021-10-05 NOTE — H&P
History & Physical Examination    Patient Name: Estefany Cameron  MRN: [id number]  CSN: [id number]  YOB: 1950    Diagnosis: Chronic gastroesophageal reflux and history of gastric polyps      zolpidem 5 MG Oral Tab, Take 1 tablet (5 mg total) by • Deep vein thrombosis ([facility])     1970 from birth control   • Dysplasia of vagina 2015    surgery done   • Esophageal reflux    • High cholesterol    • Osteoporosis    • Other and unspecified hyperlipidemia      Past Surgical History:   Procedure Matt Garner

## 2021-10-11 ENCOUNTER — HOSPITAL ENCOUNTER (OUTPATIENT)
Age: 71
Discharge: HOME | End: 2021-10-11
Payer: MEDICARE

## 2021-10-11 ENCOUNTER — APPOINTMENT (OUTPATIENT)
Dept: GENERAL RADIOLOGY | Age: 71
End: 2021-10-11
Attending: NURSE PRACTITIONER
Payer: MEDICARE

## 2021-10-11 ENCOUNTER — PATIENT MESSAGE (OUTPATIENT)
Dept: FAMILY MEDICINE CLINIC | Facility: CLINIC | Age: 71
End: 2021-10-11

## 2021-10-11 VITALS
HEART RATE: 107 BPM | BODY MASS INDEX: 22.32 KG/M2 | HEIGHT: 63 IN | TEMPERATURE: 98 F | SYSTOLIC BLOOD PRESSURE: 143 MMHG | DIASTOLIC BLOOD PRESSURE: 71 MMHG | RESPIRATION RATE: 17 BRPM | WEIGHT: 126 LBS | OXYGEN SATURATION: 99 %

## 2021-10-11 DIAGNOSIS — S62.667A CLOSED NONDISPLACED FRACTURE OF DISTAL PHALANX OF LEFT LITTLE FINGER, INITIAL ENCOUNTER: Primary | ICD-10-CM

## 2021-10-11 PROCEDURE — 73140 X-RAY EXAM OF FINGER(S): CPT | Performed by: NURSE PRACTITIONER

## 2021-10-11 PROCEDURE — 99213 OFFICE O/P EST LOW 20 MIN: CPT | Performed by: NURSE PRACTITIONER

## 2021-10-11 NOTE — ED PROVIDER NOTES
Patient presents with:  Finger Injury      HPI:     Maddison Fuentes is a 66year old female who presents today with a chief complaint of pain in the distal left fifth digit that started after an injury that occurred yesterday.   The patient went to reach fo Caffeine Concern: Yes          coffee, soda        Occupational Exposure: Not Asked        Hobby Hazards: Not Asked        Sleep Concern: Not Asked        Stress Concern: Not Asked        Weight Concern: Not Asked        Special Diet: Not Asked        Back Findings:  EXTREMITIES: no cyanosis or edema. Edema  Yes  Bruising:  Yes  Tendon function intact:  Yes, against resistance  Skin intact:  Yes  Normal sensation: Yes  Normal capillary refill: Yes  ROM:  pain to the distal left 5th digit with movement. follow-up with a hand specialist.  Repeat heart rate was 82. Diagnosis:    ICD-10-CM    1. Closed nondisplaced fracture of distal phalanx of left little finger, initial encounter  G10.538L        All results reviewed and discussed with patient.   See AVS

## 2021-10-11 NOTE — TELEPHONE ENCOUNTER
From: Enrique Franco  To: Juan Armstrong MD  Sent: 10/11/2021 12:44 PM CDT  Subject: Hair line fx of finger    Dr Kathy Maciel went to the [address] [place] [address] Drive today and was dx with a closed nondisplaced fx of distal phalanx of my left little fi

## 2021-10-14 ENCOUNTER — OFFICE VISIT (OUTPATIENT)
Dept: SURGERY | Facility: CLINIC | Age: 71
End: 2021-10-14
Payer: MEDICARE

## 2021-10-14 DIAGNOSIS — S62.667A CLOSED NONDISPLACED FRACTURE OF DISTAL PHALANX OF LEFT LITTLE FINGER, INITIAL ENCOUNTER: Primary | ICD-10-CM

## 2021-10-14 PROCEDURE — 99203 OFFICE O/P NEW LOW 30 MIN: CPT | Performed by: PLASTIC SURGERY

## 2021-10-14 PROCEDURE — 29130 APPL FINGER SPLINT STATIC: CPT | Performed by: OCCUPATIONAL THERAPIST

## 2021-10-14 NOTE — H&P
Injury 1: Nondisplaced fx distal phalanx of LSF  - Date: 10/10/21  - Days Since: Dk Moon is a 66year old female that presents with Patient presents with: Injury: Nondisplaced fracture distal phalanx of LSF  .     REFERRED BY:  Digna Greenwood • Other and unspecified hyperlipidemia         SURGICAL  Past Surgical History:   Procedure Laterality Date   • CATARACT Left 02/21/2019    implant put in per pt @ [facility]   • CATARACT Right 10/17/2019   • CHOLECYSTECTOMY  2011   • COLONOSCOPY  2012 Problem Relation Age of Onset   • Heart Disease Father    • Colon Cancer Mother    • Hypertension Mother    • Cancer Other         ovarian cancer    • Other (Other) Maternal Grandmother    • Other (Other) Maternal Grandfather    • Heart Disease Paternal complexity)       o    X-ray reviewed    • MANAGEMENT RISK  LOW    (complications/ morbidity)    o    Splint/OT                  MDM LEVEL    LOW

## 2021-10-18 NOTE — PROGRESS NOTES
Subjective: I hurt my LSF:      Objective:     Current level of performance:  ADL: Independent  Work: Retired  Leisure: Not addressed    Measurements/Tests:  ROM:         N/A         Treatment Provided this day: Fabricated a LSF extension splint per order.

## 2021-11-01 ENCOUNTER — OFFICE VISIT (OUTPATIENT)
Dept: SURGERY | Facility: CLINIC | Age: 71
End: 2021-11-01
Payer: MEDICARE

## 2021-11-01 DIAGNOSIS — M25.642 STIFFNESS OF JOINT, HAND, LEFT: Primary | ICD-10-CM

## 2021-11-01 DIAGNOSIS — M62.81 DISTAL MUSCLE WEAKNESS: ICD-10-CM

## 2021-11-01 PROCEDURE — 97166 OT EVAL MOD COMPLEX 45 MIN: CPT | Performed by: OCCUPATIONAL THERAPIST

## 2021-11-01 PROCEDURE — 97110 THERAPEUTIC EXERCISES: CPT | Performed by: OCCUPATIONAL THERAPIST

## 2021-11-01 NOTE — PROGRESS NOTES
OCCUPATIONAL THERAPY EVALUATION:   [facility]   [id number]       SUBJECTIVE:    HX of Injury: LSF DP transverse FX; Non-displaced. Chief Complaint:   LSF tightness.     Precautions: None  Premorbid Functional Status: Independent w/ driving / sitting, the treatment plan and concur.    Eugene Stewart MD

## 2021-11-08 ENCOUNTER — OFFICE VISIT (OUTPATIENT)
Dept: SURGERY | Facility: CLINIC | Age: 71
End: 2021-11-08
Payer: MEDICARE

## 2021-11-08 DIAGNOSIS — M25.642 STIFFNESS OF JOINT, HAND, LEFT: Primary | ICD-10-CM

## 2021-11-08 DIAGNOSIS — M62.81 DISTAL MUSCLE WEAKNESS: ICD-10-CM

## 2021-11-08 PROCEDURE — 99070 SPECIAL SUPPLIES PHYS/QHP: CPT | Performed by: OCCUPATIONAL THERAPIST

## 2021-11-08 PROCEDURE — 97110 THERAPEUTIC EXERCISES: CPT | Performed by: OCCUPATIONAL THERAPIST

## 2021-11-08 PROCEDURE — 97022 WHIRLPOOL THERAPY: CPT | Performed by: OCCUPATIONAL THERAPIST

## 2021-11-15 ENCOUNTER — OFFICE VISIT (OUTPATIENT)
Dept: SURGERY | Facility: CLINIC | Age: 71
End: 2021-11-15
Payer: MEDICARE

## 2021-11-15 DIAGNOSIS — S62.667A CLOSED NONDISPLACED FRACTURE OF DISTAL PHALANX OF LEFT LITTLE FINGER, INITIAL ENCOUNTER: Primary | ICD-10-CM

## 2021-11-15 DIAGNOSIS — M25.642 STIFFNESS OF JOINT, HAND, LEFT: Primary | ICD-10-CM

## 2021-11-15 DIAGNOSIS — M62.81 DISTAL MUSCLE WEAKNESS: ICD-10-CM

## 2021-11-15 PROCEDURE — 97110 THERAPEUTIC EXERCISES: CPT | Performed by: OCCUPATIONAL THERAPIST

## 2021-11-15 PROCEDURE — 99213 OFFICE O/P EST LOW 20 MIN: CPT | Performed by: PLASTIC SURGERY

## 2021-11-15 NOTE — PROGRESS NOTES
Injury 1: LSF DP transverse fracture, nondisplaced  - Date: 10/10/21  - Days Since: 36  Intermittent \"achying\" with movement. Rates pain 3/10. Tylenol,helpful. Denies numbness and tingling  Cold sensitive.     36 days post injury  Local sensitivity of

## 2021-11-15 NOTE — PROGRESS NOTES
Subjective: The tip of my LSF is sensitive.       Objective:     Current level of performance:  ADL: Independent  Work: Retired  Leisure: Not addressed    Measurements/Tests:  ROM:  Testing By: sindy   Strength Right: 40 #      Strength Left: 25 #

## 2021-12-15 ENCOUNTER — PATIENT MESSAGE (OUTPATIENT)
Dept: GASTROENTEROLOGY | Facility: CLINIC | Age: 71
End: 2021-12-15

## 2021-12-15 PROCEDURE — 88112 CYTOPATH CELL ENHANCE TECH: CPT | Performed by: CLINICAL MEDICAL LABORATORY

## 2021-12-15 PROCEDURE — 87624 HPV HI-RISK TYP POOLED RSLT: CPT | Performed by: CLINICAL MEDICAL LABORATORY

## 2021-12-15 PROCEDURE — 88175 CYTOPATH C/V AUTO FLUID REDO: CPT | Performed by: CLINICAL MEDICAL LABORATORY

## 2021-12-15 RX ORDER — DICYCLOMINE HYDROCHLORIDE 10 MG/1
CAPSULE ORAL
Qty: 60 CAPSULE | Refills: 2 | OUTPATIENT
Start: 2021-12-15

## 2021-12-15 RX ORDER — DICYCLOMINE HYDROCHLORIDE 10 MG/1
10 CAPSULE ORAL 4 TIMES DAILY PRN
Qty: 60 CAPSULE | Refills: 2 | Status: SHIPPED | OUTPATIENT
Start: 2021-12-15

## 2021-12-15 NOTE — TELEPHONE ENCOUNTER
From: Gracia Stafford  To: Kristin Vick MD  Sent: 12/15/2021 8:35 AM CST  Subject: Refills    I need refills on Nortriptyline 10mg daily #90 capsules and Dicyclomine 10mg qid.    I called [facility] for Dicyclomine and will probably be contacting you

## 2021-12-15 NOTE — TELEPHONE ENCOUNTER
Requested Prescriptions     Pending Prescriptions Disp Refills   • DICYCLOMINE 10 MG Oral Cap [Pharmacy Med Name: DICYCLOMINE 10MG CAPSULES] 60 capsule 2     Sig: TAKE ONE CAPSULE(10 MG TOTAL) BY MOUTH FOUR TIMES DAILY AS NEEDED       Please see other enco

## 2021-12-15 NOTE — TELEPHONE ENCOUNTER
I spoke to the pt and let her know I called [address] and she has a prescription there for her nortriptyline. They will get it ready for her.     She just needs a fill on the Dicyclomine    Last filled 12/14/2020  LOV 01/18/21

## 2021-12-16 ENCOUNTER — LAB REQUISITION (OUTPATIENT)
Dept: LAB | Age: 71
End: 2021-12-16

## 2021-12-16 DIAGNOSIS — Z92.89 PERSONAL HISTORY OF OTHER MEDICAL TREATMENT: ICD-10-CM

## 2021-12-16 DIAGNOSIS — A60.04 HERPESVIRAL VULVOVAGINITIS: ICD-10-CM

## 2021-12-16 DIAGNOSIS — D07.1 CARCINOMA IN SITU OF VULVA: ICD-10-CM

## 2021-12-16 DIAGNOSIS — Z12.4 ENCOUNTER FOR SCREENING FOR MALIGNANT NEOPLASM OF CERVIX: ICD-10-CM

## 2021-12-20 LAB
CASE RPRT: NORMAL
CLINICAL INFO: NORMAL
CYTOLOGY CVX/VAG STUDY: NORMAL
CYTOLOGY CVX/VAG STUDY: NORMAL
HPV16+18+45 E6+E7MRNA CVX NAA+PROBE: NEGATIVE
HPV16+18+45 E6+E7MRNA CVX NAA+PROBE: NEGATIVE
Lab: NORMAL
Lab: NORMAL
PAP EDUCATIONAL NOTE: NORMAL
SPECIMEN ADEQUACY: NORMAL

## 2022-01-05 ENCOUNTER — OFFICE VISIT (OUTPATIENT)
Dept: FAMILY MEDICINE CLINIC | Facility: CLINIC | Age: 72
End: 2022-01-05
Payer: MEDICARE

## 2022-01-05 ENCOUNTER — PATIENT MESSAGE (OUTPATIENT)
Dept: FAMILY MEDICINE CLINIC | Facility: CLINIC | Age: 72
End: 2022-01-05

## 2022-01-05 ENCOUNTER — LAB ENCOUNTER (OUTPATIENT)
Dept: LAB | Age: 72
End: 2022-01-05
Attending: FAMILY MEDICINE
Payer: MEDICARE

## 2022-01-05 VITALS
HEART RATE: 74 BPM | DIASTOLIC BLOOD PRESSURE: 78 MMHG | WEIGHT: 127 LBS | BODY MASS INDEX: 22.5 KG/M2 | SYSTOLIC BLOOD PRESSURE: 132 MMHG | HEIGHT: 63 IN

## 2022-01-05 DIAGNOSIS — G47.00 INSOMNIA, UNSPECIFIED TYPE: ICD-10-CM

## 2022-01-05 DIAGNOSIS — M81.0 OSTEOPOROSIS, UNSPECIFIED OSTEOPOROSIS TYPE, UNSPECIFIED PATHOLOGICAL FRACTURE PRESENCE: ICD-10-CM

## 2022-01-05 DIAGNOSIS — E78.2 MIXED HYPERLIPIDEMIA: ICD-10-CM

## 2022-01-05 DIAGNOSIS — R53.83 FATIGUE, UNSPECIFIED TYPE: ICD-10-CM

## 2022-01-05 DIAGNOSIS — E78.2 MIXED HYPERLIPIDEMIA: Primary | ICD-10-CM

## 2022-01-05 LAB
ALBUMIN SERPL-MCNC: 3.7 G/DL (ref 3.4–5)
ALBUMIN/GLOB SERPL: 1 {RATIO} (ref 1–2)
ALP LIVER SERPL-CCNC: 103 U/L
ALT SERPL-CCNC: 19 U/L
ANION GAP SERPL CALC-SCNC: 6 MMOL/L (ref 0–18)
AST SERPL-CCNC: 11 U/L (ref 15–37)
BILIRUB SERPL-MCNC: 0.4 MG/DL (ref 0.1–2)
BUN BLD-MCNC: 16 MG/DL (ref 7–18)
BUN/CREAT SERPL: 19.8 (ref 10–20)
CALCIUM BLD-MCNC: 9.4 MG/DL (ref 8.5–10.1)
CHLORIDE SERPL-SCNC: 107 MMOL/L (ref 98–112)
CHOLEST SERPL-MCNC: 207 MG/DL (ref ?–200)
CO2 SERPL-SCNC: 28 MMOL/L (ref 21–32)
CREAT BLD-MCNC: 0.81 MG/DL
DEPRECATED RDW RBC AUTO: 43.8 FL (ref 35.1–46.3)
ERYTHROCYTE [DISTWIDTH] IN BLOOD BY AUTOMATED COUNT: 12.6 % (ref 11–15)
FASTING PATIENT LIPID ANSWER: YES
FASTING STATUS PATIENT QL REPORTED: YES
GLOBULIN PLAS-MCNC: 3.7 G/DL (ref 2.8–4.4)
GLUCOSE BLD-MCNC: 94 MG/DL (ref 70–99)
HCT VFR BLD AUTO: 40 %
HDLC SERPL-MCNC: 55 MG/DL (ref 40–59)
HGB BLD-MCNC: 13 G/DL
LDLC SERPL CALC-MCNC: 131 MG/DL (ref ?–100)
MCH RBC QN AUTO: 31.2 PG (ref 26–34)
MCHC RBC AUTO-ENTMCNC: 32.5 G/DL (ref 31–37)
MCV RBC AUTO: 95.9 FL
NONHDLC SERPL-MCNC: 152 MG/DL (ref ?–130)
OSMOLALITY SERPL CALC.SUM OF ELEC: 293 MOSM/KG (ref 275–295)
PLATELET # BLD AUTO: 299 10(3)UL (ref 150–450)
POTASSIUM SERPL-SCNC: 3.8 MMOL/L (ref 3.5–5.1)
PROT SERPL-MCNC: 7.4 G/DL (ref 6.4–8.2)
RBC # BLD AUTO: 4.17 X10(6)UL
SODIUM SERPL-SCNC: 141 MMOL/L (ref 136–145)
TRIGL SERPL-MCNC: 116 MG/DL (ref 30–149)
VLDLC SERPL CALC-MCNC: 21 MG/DL (ref 0–30)
WBC # BLD AUTO: 5.7 X10(3) UL (ref 4–11)

## 2022-01-05 PROCEDURE — 80061 LIPID PANEL: CPT

## 2022-01-05 PROCEDURE — 36415 COLL VENOUS BLD VENIPUNCTURE: CPT

## 2022-01-05 PROCEDURE — 80053 COMPREHEN METABOLIC PANEL: CPT

## 2022-01-05 PROCEDURE — 99214 OFFICE O/P EST MOD 30 MIN: CPT | Performed by: FAMILY MEDICINE

## 2022-01-05 PROCEDURE — 85027 COMPLETE CBC AUTOMATED: CPT

## 2022-01-05 RX ORDER — ZOLPIDEM TARTRATE 5 MG/1
5 TABLET ORAL NIGHTLY PRN
Qty: 30 TABLET | Refills: 1 | Status: SHIPPED | OUTPATIENT
Start: 2022-01-05

## 2022-01-05 NOTE — PROGRESS NOTES
Mariam Pateltein is a 66year old female. Patient presents with:  Cholesterol: pt stopped taking cholesterol medicaiton       HPI:   Patient 78-year-old female presents today for check on her cholesterol.   Last time we checked she was taking her cholestero Past Surgical History:   Procedure Laterality Date   • Cataract Left 02/21/2019    implant put in per pt @ [address]   • Cataract Right 10/17/2019   • Cholecystectomy  2011   • Colonoscopy  2012   • Colonoscopy N/A 6/28/2017    Procedure: Gaurav Patel not restart any medication would have patient control with diet alone.- COMP METABOLIC PANEL (14); Future  - LIPID PANEL; Future    2. Osteoporosis, unspecified osteoporosis type, unspecified pathological fracture presence  Referral for DEXA scan.   Last DE

## 2022-01-05 NOTE — TELEPHONE ENCOUNTER
From: Elizabeth Barlow  To: Keiko Quiroga MD  Sent: 1/5/2022 3:43 PM CST  Subject: Cholesterol    Dr Devan Hankins,  My cholesterol has gone up from 146 on 1/21/21 to 207 today. My HDL is still in normal range at 55 with my LDL at 131 up from 66 last January.

## 2022-01-06 RX ORDER — ROSUVASTATIN CALCIUM 5 MG/1
5 TABLET, COATED ORAL NIGHTLY
Qty: 90 TABLET | Refills: 3 | Status: SHIPPED | OUTPATIENT
Start: 2022-01-06

## 2022-01-20 ENCOUNTER — HOSPITAL ENCOUNTER (OUTPATIENT)
Dept: MAMMOGRAPHY | Facility: HOSPITAL | Age: 72
Discharge: HOME | End: 2022-01-20
Attending: FAMILY MEDICINE
Payer: MEDICARE

## 2022-01-20 DIAGNOSIS — Z12.31 ENCOUNTER FOR SCREENING MAMMOGRAM FOR MALIGNANT NEOPLASM OF BREAST: ICD-10-CM

## 2022-01-20 PROCEDURE — 77067 SCR MAMMO BI INCL CAD: CPT | Performed by: FAMILY MEDICINE

## 2022-01-20 PROCEDURE — 77063 BREAST TOMOSYNTHESIS BI: CPT | Performed by: FAMILY MEDICINE

## 2022-03-04 ENCOUNTER — PATIENT MESSAGE (OUTPATIENT)
Dept: FAMILY MEDICINE CLINIC | Facility: CLINIC | Age: 72
End: 2022-03-04

## 2022-03-04 RX ORDER — ROSUVASTATIN CALCIUM 5 MG/1
5 TABLET, COATED ORAL NIGHTLY
Qty: 90 TABLET | Refills: 0 | Status: SHIPPED | OUTPATIENT
Start: 2022-03-04 | End: 2022-03-09

## 2022-03-04 NOTE — TELEPHONE ENCOUNTER
Pt had refills remaining on her rosuvastatin. Rx transferred to [place], [place] for 90 days supply. Please see Pt's message and advise. Larry Gerard RN 3/4/2022 12:12 PM CST        ----- Message -----  From: Elder Barlowlow  Sent: 3/4/2022   9:08 AM CST  To: Dacia Rn Triage  Subject: Sleeping Pills                                   Dr Avery Bray,  On Feb 9 my sister had a stroke and was diagnosed with metastatic cancer. She lives in [place] so I came down the next day and have been here since. She is now in hospice with maybe 2 weeks to live. Obviously hospice doesn't have a crystal ball but I see her declining each day. I always promised her I'd always be there for her so I can't leave till this has ended for her. She is only79 years old with a loving , three adult kids and three grandchildren so it's so unfair. My question is you had given me generic ambien to sleep when I was coming here for a vacation. I have about a weeks worth left. What do you think if i tried  Melatonin? I've never taken it before and would like a brand suggestion and dosage if you think this is work the a try. Also I need a refill on my generic Crestor since I didn't bring the bottle with me never thinking i wouldn't be home. The pharmacy here is [facility] [phone] at [address] [place] [address], [address] [place] [address]. Thank you for your helping during this difficult time.

## 2022-03-09 RX ORDER — ROSUVASTATIN CALCIUM 5 MG/1
5 TABLET, COATED ORAL NIGHTLY
Qty: 90 TABLET | Refills: 0 | Status: SHIPPED | OUTPATIENT
Start: 2022-03-09

## 2022-03-18 RX ORDER — NORTRIPTYLINE HYDROCHLORIDE 10 MG/1
10 CAPSULE ORAL NIGHTLY
Qty: 90 CAPSULE | Refills: 0 | Status: SHIPPED | OUTPATIENT
Start: 2022-03-18

## 2022-04-04 ENCOUNTER — HOSPITAL ENCOUNTER (OUTPATIENT)
Dept: BONE DENSITY | Facility: HOSPITAL | Age: 72
Discharge: HOME | End: 2022-04-04
Attending: FAMILY MEDICINE
Payer: MEDICARE

## 2022-04-04 DIAGNOSIS — M81.0 OSTEOPOROSIS, UNSPECIFIED OSTEOPOROSIS TYPE, UNSPECIFIED PATHOLOGICAL FRACTURE PRESENCE: ICD-10-CM

## 2022-04-04 PROCEDURE — 77080 DXA BONE DENSITY AXIAL: CPT | Performed by: FAMILY MEDICINE

## 2022-04-19 ENCOUNTER — OFFICE VISIT (OUTPATIENT)
Dept: FAMILY MEDICINE CLINIC | Facility: CLINIC | Age: 72
End: 2022-04-19
Payer: MEDICARE

## 2022-04-19 VITALS
HEART RATE: 74 BPM | HEIGHT: 63 IN | BODY MASS INDEX: 22.15 KG/M2 | SYSTOLIC BLOOD PRESSURE: 125 MMHG | DIASTOLIC BLOOD PRESSURE: 75 MMHG | WEIGHT: 125 LBS | TEMPERATURE: 98 F

## 2022-04-19 DIAGNOSIS — Z00.00 ENCOUNTER FOR ANNUAL HEALTH EXAMINATION: Primary | ICD-10-CM

## 2022-04-19 DIAGNOSIS — E78.2 MIXED HYPERLIPIDEMIA: ICD-10-CM

## 2022-04-19 DIAGNOSIS — M85.859 OSTEOPENIA OF NECK OF FEMUR, UNSPECIFIED LATERALITY: ICD-10-CM

## 2022-04-19 PROCEDURE — 90670 PCV13 VACCINE IM: CPT | Performed by: FAMILY MEDICINE

## 2022-04-19 PROCEDURE — G0009 ADMIN PNEUMOCOCCAL VACCINE: HCPCS | Performed by: FAMILY MEDICINE

## 2022-04-19 PROCEDURE — G0439 PPPS, SUBSEQ VISIT: HCPCS | Performed by: FAMILY MEDICINE

## 2022-04-19 RX ORDER — IBANDRONATE SODIUM 150 MG/1
150 TABLET, FILM COATED ORAL
Qty: 3 TABLET | Refills: 3 | Status: SHIPPED | OUTPATIENT
Start: 2022-04-19

## 2022-04-28 ENCOUNTER — OFFICE VISIT (OUTPATIENT)
Dept: GASTROENTEROLOGY | Facility: CLINIC | Age: 72
End: 2022-04-28
Payer: MEDICARE

## 2022-04-28 ENCOUNTER — TELEPHONE (OUTPATIENT)
Dept: GASTROENTEROLOGY | Facility: CLINIC | Age: 72
End: 2022-04-28

## 2022-04-28 VITALS
HEIGHT: 63 IN | SYSTOLIC BLOOD PRESSURE: 120 MMHG | HEART RATE: 71 BPM | BODY MASS INDEX: 22.32 KG/M2 | DIASTOLIC BLOOD PRESSURE: 67 MMHG | WEIGHT: 126 LBS

## 2022-04-28 DIAGNOSIS — K31.7 GASTRIC POLYPS: ICD-10-CM

## 2022-04-28 DIAGNOSIS — K21.9 GASTROESOPHAGEAL REFLUX DISEASE WITHOUT ESOPHAGITIS: ICD-10-CM

## 2022-04-28 DIAGNOSIS — Z80.0 FAMILY HISTORY OF COLON CANCER: Primary | ICD-10-CM

## 2022-04-28 PROCEDURE — 99214 OFFICE O/P EST MOD 30 MIN: CPT | Performed by: INTERNAL MEDICINE

## 2022-04-28 RX ORDER — SODIUM PICOSULFATE, MAGNESIUM OXIDE, AND ANHYDROUS CITRIC ACID 10; 3.5; 12 MG/160ML; G/160ML; G/160ML
1 LIQUID ORAL ONCE
Qty: 1 EACH | Refills: 0 | Status: SHIPPED | OUTPATIENT
Start: 2022-04-28 | End: 2022-04-28

## 2022-04-28 NOTE — TELEPHONE ENCOUNTER
Dr. James Gonzalez-    Are any of the below listed possible prep alternatives appropriate for the patient? Or would you like us to obtain prior authorization to try and get coverage from her insurance?     Thank you

## 2022-04-28 NOTE — PATIENT INSTRUCTIONS
Schedule colonoscopy for a personal history of colon polyps, family history of colon cancer following a split dose Clenpiq preparation, pediatric colonoscope and monitored anesthesia care.

## 2022-04-28 NOTE — TELEPHONE ENCOUNTER
PPD-     Please assist in obtaining prior authorization for patient's Clenpiq bowel prep (due to difficulty tolerating alternative preferred preparations in that past).     Thank you

## 2022-04-28 NOTE — TELEPHONE ENCOUNTER
I would try to obtain authorization as she has had difficulty tolerating multiple bowel preparations previously. Cost if not covered?

## 2022-04-29 ENCOUNTER — TELEPHONE (OUTPATIENT)
Dept: GASTROENTEROLOGY | Facility: CLINIC | Age: 72
End: 2022-04-29

## 2022-04-29 NOTE — TELEPHONE ENCOUNTER
Scheduled for:  Colonoscopy - [address]  Provider Name:  Dr. Brandon Burnham  Date:  10/18/22  Location:  [facility]  Sedation:  MAC  Time:  9:45 am (pt is aware to arrive at 8:45 am)  Prep:  Clenpiq, Prep instructions were given to pt in the office, pt verbalized understanding. Meds/Allergies Reconciled?:  Yes, Physician reviewed    Diagnosis with codes:  Hx of colon polyps - Z86.010, Family hx of colon CA - Z80.0  Was patient informed to call insurance with codes (Y/N):  Yes, I confirmed [facility] insurance with this patient. Referral sent?:  Yes, Referral was sent at the time of electronic surgical scheduling. [facility] or [address] notified?:  Yes, I sent an electronic request to Endo Scheduling and received a confirmation today. Medication Orders:  N/A  Misc Orders:  Patient was informed that they will need a COVID 19 test prior to their procedure. Patient verbally understood & will await a phone call from [facility] to schedule. Further instructions given by staff:  I discussed the prep instructions with the patient which she verbally understood.

## 2022-05-02 ENCOUNTER — PATIENT MESSAGE (OUTPATIENT)
Dept: FAMILY MEDICINE CLINIC | Facility: CLINIC | Age: 72
End: 2022-05-02

## 2022-05-02 NOTE — TELEPHONE ENCOUNTER
From: Elma Lea  To: Geovany Wbester MD  Sent: 5/2/2022 4:30 PM CDT  Subject: Ibandronate    Dr Dino Zee,  I'm having second thoughts on agreeing to take the generic Boniva. Reading through the possible side effects has me concerned. I'm not normally like this as every drug has side effects. I always say if you worry about all the side effects, we'd never take any drug. A couple questions - can I still take TUMS for calcium every day? I take 3 at 750mg each. Also, can I still take antacids such as Pepcid 20mg at bedtime. I already get random aches and pains and the side effect list includes pain in arms and legs, back, muscle or joint pain. I'm concerned if I start to get side effects will it take a week to get out of my system or longer? I guess I'm looking for reassurance this is the right course of treatment for me at this time.

## 2022-05-03 NOTE — TELEPHONE ENCOUNTER
I called patient's Pharmacy, [facility] in [place], and spoke with Ed. Inquired about cost of Clenpiq w/o insurance coverage, which, out-of-pocket:  $210.00     Found coupon via [employer] site:    Cheyenne Naqvi: [id number]  RxID: [id number]  RxPCN: [id number]: [id number]      Pharmacy staff applied the above coupon. Cost total: $135.59    Patient is scheduled for 10-18-22 CLN. Will inform Tricia Hawkins to ensure she is aware of price & see how she would like to proceed.

## 2022-05-11 NOTE — TELEPHONE ENCOUNTER
Called [employer] [phone], spoke with Mark Jose, she states ClenPiq PA was denied. Letter with denial rationale was faxed to office. Requested refax to [phone].    Call ref # [id number]

## 2022-05-13 NOTE — TELEPHONE ENCOUNTER
Fax denial letter from Jennifer Mariee received for Cognitum. #: [id number]    Record #: [id number]    Per Medicare Part D benefits, request denied for coverage based off of Formulary Exception criteria. Must have tried and failed 2 additional drugs for condition covered by prescription drug plan: Jeanne Schmitt or Trilyte, generic moviprep, suprep.

## 2022-06-21 RX ORDER — NORTRIPTYLINE HYDROCHLORIDE 10 MG/1
10 CAPSULE ORAL NIGHTLY
Qty: 90 CAPSULE | Refills: 1 | Status: SHIPPED | OUTPATIENT
Start: 2022-06-21

## 2022-06-22 PROCEDURE — 87624 HPV HI-RISK TYP POOLED RSLT: CPT | Performed by: CLINICAL MEDICAL LABORATORY

## 2022-06-22 PROCEDURE — 88112 CYTOPATH CELL ENHANCE TECH: CPT | Performed by: CLINICAL MEDICAL LABORATORY

## 2022-06-22 PROCEDURE — 88175 CYTOPATH C/V AUTO FLUID REDO: CPT | Performed by: CLINICAL MEDICAL LABORATORY

## 2022-06-23 ENCOUNTER — LAB REQUISITION (OUTPATIENT)
Dept: LAB | Age: 72
End: 2022-06-23

## 2022-06-23 DIAGNOSIS — Z12.4 ENCOUNTER FOR SCREENING FOR MALIGNANT NEOPLASM OF CERVIX: ICD-10-CM

## 2022-06-23 DIAGNOSIS — Z91.89 OTHER SPECIFIED PERSONAL RISK FACTORS, NOT ELSEWHERE CLASSIFIED: ICD-10-CM

## 2022-06-23 DIAGNOSIS — D07.1 CARCINOMA IN SITU OF VULVA: ICD-10-CM

## 2022-06-24 LAB
ASR DISCLAIMER: NORMAL
CASE RPRT: NORMAL
CASE RPRT: NORMAL
CLINICAL INFO: NORMAL
CLINICAL INFO: NORMAL
CYTOLOGY CVX/VAG STUDY: NORMAL
HPV16+18+45 E6+E7MRNA CVX NAA+PROBE: NEGATIVE
HPV16+18+45 E6+E7MRNA CVX NAA+PROBE: NEGATIVE
Lab: NORMAL
Lab: NORMAL
PAP EDUCATIONAL NOTE: NORMAL
PATH REPORT.FINAL DX SPEC: NORMAL
PATH REPORT.GROSS SPEC: NORMAL
SPECIMEN ADEQUACY: NORMAL

## 2022-07-06 ENCOUNTER — PATIENT MESSAGE (OUTPATIENT)
Dept: GASTROENTEROLOGY | Facility: CLINIC | Age: 72
End: 2022-07-06

## 2022-07-06 ENCOUNTER — TELEPHONE (OUTPATIENT)
Dept: GASTROENTEROLOGY | Facility: CLINIC | Age: 72
End: 2022-07-06

## 2022-07-07 NOTE — TELEPHONE ENCOUNTER
I spoke to Yanira. Her sister's records were reviewed. Her sister at the age of 77 was diagnosed with a metastatic adenocarcinoma to the liver. The immunochemical staining favored a colonic primary. Fidelina's mother also had colon cancer at the age of 80. Yanira has no children, however, her sister has 3 children ranging from the age of 37-38. Fidelina visits a gynecologic oncologist who advised genetic testing which has been drawn but is pending. I have recommended the followin. Fidelina's nieces and nephew should begin screening colonoscopy at the age of 36 and no less frequently than every 5 years thereafter. [address] [place] [address] is appropriately scheduled for a colonoscopy this year. 3.  I have asked Yanira to forward her genetic test results to me when available.

## 2022-07-07 NOTE — TELEPHONE ENCOUNTER
From: Justice Salmon  To: Shadi Carr MD  Sent: 7/6/2022 4:16 PM CDT  Subject: My Sister's Liver Biopsy    Dr Beatriz Atwood,  I left an envelope for you at the desk today. It contains an explanation and copies of my sister's liver biopsy and CT of abdomen/pelvis. At my last visit we talked about my sister passing away from metastatic cancer and us not knowing the primary site. The thought was that it was GI. You generously offered to look over the results of her biopsy if I ever had a copy. I know how busy you whatever you can tell me will be greatly appreciated. Fidelina Nicole

## 2022-07-07 NOTE — TELEPHONE ENCOUNTER
Please see TE from yesterday:    \"I spoke to [facility]. Her sister's records were reviewed. Her sister at the age of 77 was diagnosed with a metastatic adenocarcinoma to the liver. The immunochemical staining favored a colonic primary. Fidelina's mother also had colon cancer at the age of 80. [facility] has no children, however, her sister has 3 children ranging from the age of 37-38. Fidelina visits a gynecologic oncologist who advised genetic testing which has been drawn but is pending. I have recommended the followin. Fidelina's nieces and nephew should begin screening colonoscopy at the age of 36 and no less frequently than every 5 years thereafter. [address] [place] [address] is appropriately scheduled for a colonoscopy this year. 3.  I have asked [facility] to forward her genetic test results to me when available\".

## 2022-07-11 ENCOUNTER — PATIENT MESSAGE (OUTPATIENT)
Dept: FAMILY MEDICINE CLINIC | Facility: CLINIC | Age: 72
End: 2022-07-11

## 2022-07-12 ENCOUNTER — TELEPHONE (OUTPATIENT)
Dept: GASTROENTEROLOGY | Facility: CLINIC | Age: 72
End: 2022-07-12

## 2022-07-12 NOTE — TELEPHONE ENCOUNTER
Routed to Dr Sabrina Dubon. Kaylynn for advise, thanks.   Last labs done 1-5-22      Future Appointments   Date Time Provider Rebel Braun   10/18/2022  9:45 AM [facility], PROCEDURE [id number] None

## 2022-07-12 NOTE — TELEPHONE ENCOUNTER
Dr. Washington Reagan with [facility]. She had #1 episode of urinating, where she noticed scat amount of pink(emphasizes fact that it was not red) when wiping front to back. States she ensured it did come rectally. Very concerned about this due to family history of colon cancer, and calling to notify you and make sure this not anything significant for this reason. She did take Pepto Bismol on Saturday.

## 2022-07-12 NOTE — TELEPHONE ENCOUNTER
From: Luis Angel Dash  To: Tawana Hess MD  Sent: 7/11/2022 6:44 PM CDT  Subject: Lab Work    Dr Dillon Carmona,  I had lab work on 1/5/22 and you put me back on my cholesterol medication. I believe I was to come back in six months which would be now. Should I have my lab work prior to seeing you in the office? Or have the lab work drawn at my visit with you? Thank you.   Yulisa Wang

## 2022-07-12 NOTE — TELEPHONE ENCOUNTER
Pt states that yesterday she noticed some pink on tissue after wiping and is concerned - especially due to family history of colon cancer. It only happened one time but would like to RN. Please call.

## 2022-07-12 NOTE — TELEPHONE ENCOUNTER
Please let Master Mcdermott know that I do not feel that what she saw is significant. She should, however, let us know if she has recurrent episodes or any other new symptoms.

## 2022-07-13 NOTE — TELEPHONE ENCOUNTER
Discussed md's input based on symptoms reported. Reinforced contacting our office again if episodes become more frequently and/or develop new symptoms. Expressed understanding and appreciative for follow up. Believes it was probably due to taking pepto bismol but will monitor closely.

## 2022-07-22 ENCOUNTER — HOSPITAL ENCOUNTER (OUTPATIENT)
Age: 72
Discharge: HOME | End: 2022-07-22
Payer: MEDICARE

## 2022-07-22 VITALS
SYSTOLIC BLOOD PRESSURE: 138 MMHG | TEMPERATURE: 99 F | DIASTOLIC BLOOD PRESSURE: 63 MMHG | OXYGEN SATURATION: 100 % | HEART RATE: 80 BPM | RESPIRATION RATE: 20 BRPM

## 2022-07-22 DIAGNOSIS — L25.9 CONTACT DERMATITIS, UNSPECIFIED CONTACT DERMATITIS TYPE, UNSPECIFIED TRIGGER: Primary | ICD-10-CM

## 2022-07-22 PROCEDURE — 99213 OFFICE O/P EST LOW 20 MIN: CPT | Performed by: NURSE PRACTITIONER

## 2022-07-22 RX ORDER — HYDROXYZINE PAMOATE 25 MG/1
25 CAPSULE ORAL 3 TIMES DAILY PRN
Qty: 15 CAPSULE | Refills: 0 | Status: SHIPPED | OUTPATIENT
Start: 2022-07-22

## 2022-07-22 RX ORDER — TRIAMCINOLONE ACETONIDE 1 MG/G
CREAM TOPICAL 3 TIMES DAILY
Qty: 45 G | Refills: 0 | Status: SHIPPED | OUTPATIENT
Start: 2022-07-22

## 2022-07-22 NOTE — ED INITIAL ASSESSMENT (HPI)
Pt states having a rash on chest and neck that began on Wednesday. Pt states is itchy but not painful.

## 2022-07-23 ENCOUNTER — HOSPITAL ENCOUNTER (OUTPATIENT)
Age: 72
Discharge: HOME | End: 2022-07-23
Payer: MEDICARE

## 2022-07-23 VITALS
DIASTOLIC BLOOD PRESSURE: 71 MMHG | HEART RATE: 77 BPM | RESPIRATION RATE: 18 BRPM | OXYGEN SATURATION: 100 % | SYSTOLIC BLOOD PRESSURE: 124 MMHG | TEMPERATURE: 97 F

## 2022-07-23 DIAGNOSIS — L25.9 CONTACT DERMATITIS, UNSPECIFIED CONTACT DERMATITIS TYPE, UNSPECIFIED TRIGGER: Primary | ICD-10-CM

## 2022-07-23 PROCEDURE — 99213 OFFICE O/P EST LOW 20 MIN: CPT | Performed by: NURSE PRACTITIONER

## 2022-07-23 RX ORDER — METHYLPREDNISOLONE 4 MG/1
TABLET ORAL
Qty: 1 EACH | Refills: 0 | Status: SHIPPED | OUTPATIENT
Start: 2022-07-23

## 2022-07-23 NOTE — ED INITIAL ASSESSMENT (HPI)
Pt here with a rash to her chest area that has now spread to her right arm and lower back, pt states she was seen in the IC yesterday and for the chest rash and was prescribed triamcinolone cream, pt denies any pain or fevers

## 2022-07-28 ENCOUNTER — OFFICE VISIT (OUTPATIENT)
Dept: FAMILY MEDICINE CLINIC | Facility: CLINIC | Age: 72
End: 2022-07-28
Payer: MEDICARE

## 2022-07-28 VITALS
OXYGEN SATURATION: 97 % | BODY MASS INDEX: 22.32 KG/M2 | HEART RATE: 82 BPM | RESPIRATION RATE: 16 BRPM | WEIGHT: 126 LBS | HEIGHT: 63 IN | DIASTOLIC BLOOD PRESSURE: 76 MMHG | SYSTOLIC BLOOD PRESSURE: 108 MMHG

## 2022-07-28 DIAGNOSIS — E78.2 MIXED HYPERLIPIDEMIA: Primary | ICD-10-CM

## 2022-07-28 DIAGNOSIS — L24.9 IRRITANT CONTACT DERMATITIS, UNSPECIFIED TRIGGER: ICD-10-CM

## 2022-07-28 PROBLEM — H16.149 PUNCTATE KERATITIS: Status: ACTIVE | Noted: 2022-07-28

## 2022-07-28 PROBLEM — H43.392 VITREOUS FLOATERS OF LEFT EYE: Status: ACTIVE | Noted: 2022-07-28

## 2022-07-28 PROBLEM — H01.029 SQUAMOUS BLEPHARITIS: Status: ACTIVE | Noted: 2022-07-28

## 2022-07-28 PROBLEM — H43.819 VITREOUS DEGENERATION: Status: ACTIVE | Noted: 2022-07-28

## 2022-07-28 PROCEDURE — 99213 OFFICE O/P EST LOW 20 MIN: CPT | Performed by: FAMILY MEDICINE

## 2022-07-28 RX ORDER — SODIUM PICOSULFATE, MAGNESIUM OXIDE, AND ANHYDROUS CITRIC ACID 10; 3.5; 12 MG/160ML; G/160ML; G/160ML
LIQUID ORAL
COMMUNITY
Start: 2022-05-16

## 2022-07-29 ENCOUNTER — LAB ENCOUNTER (OUTPATIENT)
Dept: LAB | Age: 72
End: 2022-07-29
Attending: FAMILY MEDICINE
Payer: MEDICARE

## 2022-07-29 DIAGNOSIS — E78.2 MIXED HYPERLIPIDEMIA: ICD-10-CM

## 2022-07-29 LAB
ALBUMIN SERPL-MCNC: 3.7 G/DL (ref 3.4–5)
ALBUMIN/GLOB SERPL: 1 {RATIO} (ref 1–2)
ALP LIVER SERPL-CCNC: 99 U/L
ALT SERPL-CCNC: 25 U/L
ANION GAP SERPL CALC-SCNC: 7 MMOL/L (ref 0–18)
AST SERPL-CCNC: 14 U/L (ref 15–37)
BASOPHILS # BLD AUTO: 0.08 X10(3) UL (ref 0–0.2)
BASOPHILS NFR BLD AUTO: 0.9 %
BILIRUB SERPL-MCNC: 0.5 MG/DL (ref 0.1–2)
BUN BLD-MCNC: 19 MG/DL (ref 7–18)
BUN/CREAT SERPL: 20.9 (ref 10–20)
CALCIUM BLD-MCNC: 9.1 MG/DL (ref 8.5–10.1)
CHLORIDE SERPL-SCNC: 107 MMOL/L (ref 98–112)
CHOLEST SERPL-MCNC: 175 MG/DL (ref ?–200)
CO2 SERPL-SCNC: 29 MMOL/L (ref 21–32)
CREAT BLD-MCNC: 0.91 MG/DL
DEPRECATED RDW RBC AUTO: 44.4 FL (ref 35.1–46.3)
EOSINOPHIL # BLD AUTO: 0.22 X10(3) UL (ref 0–0.7)
EOSINOPHIL NFR BLD AUTO: 2.5 %
ERYTHROCYTE [DISTWIDTH] IN BLOOD BY AUTOMATED COUNT: 12.3 % (ref 11–15)
FASTING PATIENT LIPID ANSWER: YES
FASTING STATUS PATIENT QL REPORTED: YES
GLOBULIN PLAS-MCNC: 3.7 G/DL (ref 2.8–4.4)
GLUCOSE BLD-MCNC: 72 MG/DL (ref 70–99)
HCT VFR BLD AUTO: 43.8 %
HDLC SERPL-MCNC: 56 MG/DL (ref 40–59)
HGB BLD-MCNC: 13.8 G/DL
IMM GRANULOCYTES # BLD AUTO: 0.02 X10(3) UL (ref 0–1)
IMM GRANULOCYTES NFR BLD: 0.2 %
LDLC SERPL CALC-MCNC: 87 MG/DL (ref ?–100)
LYMPHOCYTES # BLD AUTO: 3.96 X10(3) UL (ref 1–4)
LYMPHOCYTES NFR BLD AUTO: 45.7 %
MCH RBC QN AUTO: 30.9 PG (ref 26–34)
MCHC RBC AUTO-ENTMCNC: 31.5 G/DL (ref 31–37)
MCV RBC AUTO: 98.2 FL
MONOCYTES # BLD AUTO: 0.57 X10(3) UL (ref 0.1–1)
MONOCYTES NFR BLD AUTO: 6.6 %
NEUTROPHILS # BLD AUTO: 3.82 X10 (3) UL (ref 1.5–7.7)
NEUTROPHILS # BLD AUTO: 3.82 X10(3) UL (ref 1.5–7.7)
NEUTROPHILS NFR BLD AUTO: 44.1 %
NONHDLC SERPL-MCNC: 119 MG/DL (ref ?–130)
OSMOLALITY SERPL CALC.SUM OF ELEC: 297 MOSM/KG (ref 275–295)
PLATELET # BLD AUTO: 348 10(3)UL (ref 150–450)
POTASSIUM SERPL-SCNC: 3.8 MMOL/L (ref 3.5–5.1)
PROT SERPL-MCNC: 7.4 G/DL (ref 6.4–8.2)
RBC # BLD AUTO: 4.46 X10(6)UL
SODIUM SERPL-SCNC: 143 MMOL/L (ref 136–145)
TRIGL SERPL-MCNC: 193 MG/DL (ref 30–149)
VLDLC SERPL CALC-MCNC: 31 MG/DL (ref 0–30)
WBC # BLD AUTO: 8.7 X10(3) UL (ref 4–11)

## 2022-07-29 PROCEDURE — 80061 LIPID PANEL: CPT

## 2022-07-29 PROCEDURE — 80053 COMPREHEN METABOLIC PANEL: CPT

## 2022-07-29 PROCEDURE — 36415 COLL VENOUS BLD VENIPUNCTURE: CPT

## 2022-07-29 PROCEDURE — 85025 COMPLETE CBC W/AUTO DIFF WBC: CPT

## 2022-08-14 ENCOUNTER — PATIENT MESSAGE (OUTPATIENT)
Dept: FAMILY MEDICINE CLINIC | Facility: CLINIC | Age: 72
End: 2022-08-14

## 2022-08-14 RX ORDER — ZOLPIDEM TARTRATE 5 MG/1
5 TABLET ORAL NIGHTLY PRN
Qty: 30 TABLET | Refills: 1 | Status: SHIPPED | OUTPATIENT
Start: 2022-08-14

## 2022-08-15 NOTE — TELEPHONE ENCOUNTER
Following up on the below in anticipation of procedure. [id number] to determine the cost of the Clenpiq due to insurance denial of coverage. Informed patient pt $135.00 out-of-pocket. Nothing further needed.

## 2022-08-15 NOTE — TELEPHONE ENCOUNTER
From: Edna Culp  To: Nuria Rivers MD  Sent: 8/14/2022 3:46 PM CDT  Subject: Refill on Generic Ambien    Dr Yahir Mascorro,  I'm going out of town in a couple weeks and would like a refill on Generic Ambien due to insomnia when I'm out of town. Thank you.   Dyana York

## 2022-08-16 ENCOUNTER — TELEPHONE (OUTPATIENT)
Dept: GASTROENTEROLOGY | Facility: CLINIC | Age: 72
End: 2022-08-16

## 2022-08-17 NOTE — TELEPHONE ENCOUNTER
Spoke with Swetha Huang w/  confirmation. Conveyed MD response to genetic testing results. Verbalized understanding with no additional questions/concerns. Assured keeping colonoscopy for October as scheduled.

## 2022-08-17 NOTE — TELEPHONE ENCOUNTER
Please let the patient know that I have reviewed the results and that the genetic testing was negative. I would proceed with a colonoscopy as scheduled.

## 2022-08-23 ENCOUNTER — PATIENT MESSAGE (OUTPATIENT)
Dept: GASTROENTEROLOGY | Facility: CLINIC | Age: 72
End: 2022-08-23

## 2022-08-23 NOTE — TELEPHONE ENCOUNTER
From: Caity Curry  To: Jeanna Masters MD  Sent: 8/23/2022 10:27 AM CDT  Subject: My Sister     Stella Led  I'm so sorry to bother you . My family and I are struggling with my sister's death and fear we missed something. You were kind enough to be review her liver biopsy results which I had given you. Upon review you felt it was almost certain her primary diagnosis was colon cancer. I know you don't have a crystal ball but are you able to give us any idea how long this was going on? With metastasis to her liver and omentum and possibly other areas was it over a year, two years, longer? Her doctors in [place] have been anything but helpful even when my sister was still alive. I'm grateful for whatever you can tell us.    Ashley Luque

## 2022-09-13 RX ORDER — NORTRIPTYLINE HYDROCHLORIDE 10 MG/1
10 CAPSULE ORAL NIGHTLY
Qty: 90 CAPSULE | Refills: 1 | Status: SHIPPED | OUTPATIENT
Start: 2022-09-13

## 2022-10-05 ENCOUNTER — PATIENT MESSAGE (OUTPATIENT)
Dept: FAMILY MEDICINE CLINIC | Facility: CLINIC | Age: 72
End: 2022-10-05

## 2022-10-05 ENCOUNTER — PATIENT MESSAGE (OUTPATIENT)
Dept: GASTROENTEROLOGY | Facility: CLINIC | Age: 72
End: 2022-10-05

## 2022-10-06 ENCOUNTER — TELEPHONE (OUTPATIENT)
Dept: FAMILY MEDICINE CLINIC | Facility: CLINIC | Age: 72
End: 2022-10-06

## 2022-10-06 ENCOUNTER — TELEPHONE (OUTPATIENT)
Facility: CLINIC | Age: 72
End: 2022-10-06

## 2022-10-06 NOTE — TELEPHONE ENCOUNTER
Spoke to patient (name and  of patient verified). She reports she saw her dermatologist on Tuesday for a skin check and reported that her back was itching. Her dermatologist looked at the area and thought it was shingles  (patient states she is fully vaccinated). Her dermatologist prescribed kenalog cream and valtrex 1gm TID. Patient reports she took 4 doses, the last dose she took was yesterday at 3:30 pm due to side effects of: HA, nausea, fatigue, and stomach pain. Patient reports she is still using the kenalog cream and the rash looks just about gone today and the area is not itching anymore. Patient would like to know if she needs an alternative treatment or if it is ok to just discontinue Valtrex. Patient also reports she has an upcoming colonoscopy. She is unsure if she needs any clearance ahead of time. Instructed patient to call gastroenterology for guidance, verbalized understanding. Future Appointments   Date Time Provider Rebel Braun   10/18/2022  9:45 AM Maylene Severs None Dr. Tarry Rancher, please advise if patient needs an alternative treatment for shingles, if she can discontinue Valtrex, or other recommendations. Thank you.

## 2022-10-06 NOTE — TELEPHONE ENCOUNTER
From: Brii Trujillo  To: Coralee Phoenix, MD  Sent: 10/5/2022 7:07 PM CDT  Subject: Covid Test    I'm scheduled for a colonoscopy on Tuesday October 18. Do I need a COVID test prior to the procedure?   Reginaldo Roche

## 2022-10-06 NOTE — TELEPHONE ENCOUNTER
Patient indicates her dermatologist determined that she had shingles, patient is up to date with her shingles vaccines. Patient asking if she requires clearance for her colonoscopy on 10/18. Patient asking if covid test is still required prior to colonoscopy. Please call at [phone],[phone].

## 2022-10-10 ENCOUNTER — TELEPHONE (OUTPATIENT)
Dept: GASTROENTEROLOGY | Facility: CLINIC | Age: 72
End: 2022-10-10

## 2022-10-10 NOTE — TELEPHONE ENCOUNTER
I spoke to the pt and answered all of her questions regarding the Clenpiq. She asked if she could start the first half of the bowel prep a little earlier in the day.     I told her that would be fine and she will start the second half of the prep as directed 6 hours prior to the procedure

## 2022-10-18 ENCOUNTER — ANESTHESIA (OUTPATIENT)
Dept: ENDOSCOPY | Facility: HOSPITAL | Age: 72
End: 2022-10-18
Payer: MEDICARE

## 2022-10-18 ENCOUNTER — HOSPITAL ENCOUNTER (OUTPATIENT)
Facility: HOSPITAL | Age: 72
Setting detail: HOSPITAL OUTPATIENT SURGERY
Discharge: HOME | End: 2022-10-18
Attending: INTERNAL MEDICINE | Admitting: INTERNAL MEDICINE
Payer: MEDICARE

## 2022-10-18 ENCOUNTER — ANESTHESIA EVENT (OUTPATIENT)
Dept: ENDOSCOPY | Facility: HOSPITAL | Age: 72
End: 2022-10-18
Payer: MEDICARE

## 2022-10-18 VITALS
SYSTOLIC BLOOD PRESSURE: 140 MMHG | TEMPERATURE: 98 F | RESPIRATION RATE: 20 BRPM | BODY MASS INDEX: 22.32 KG/M2 | HEART RATE: 64 BPM | WEIGHT: 126 LBS | DIASTOLIC BLOOD PRESSURE: 64 MMHG | HEIGHT: 63 IN | OXYGEN SATURATION: 100 %

## 2022-10-18 DIAGNOSIS — Z80.0 FAMILY HISTORY OF COLON CANCER: ICD-10-CM

## 2022-10-18 DIAGNOSIS — Z86.010 PERSONAL HISTORY OF COLONIC POLYPS: ICD-10-CM

## 2022-10-18 PROCEDURE — 0DBN8ZX EXCISION OF SIGMOID COLON, VIA NATURAL OR ARTIFICIAL OPENING ENDOSCOPIC, DIAGNOSTIC: ICD-10-PCS | Performed by: INTERNAL MEDICINE

## 2022-10-18 PROCEDURE — 0DBL8ZX EXCISION OF TRANSVERSE COLON, VIA NATURAL OR ARTIFICIAL OPENING ENDOSCOPIC, DIAGNOSTIC: ICD-10-PCS | Performed by: INTERNAL MEDICINE

## 2022-10-18 PROCEDURE — 45385 COLONOSCOPY W/LESION REMOVAL: CPT | Performed by: INTERNAL MEDICINE

## 2022-10-18 RX ORDER — NALOXONE HYDROCHLORIDE 0.4 MG/ML
80 INJECTION, SOLUTION INTRAMUSCULAR; INTRAVENOUS; SUBCUTANEOUS AS NEEDED
Status: DISCONTINUED | OUTPATIENT
Start: 2022-10-18 | End: 2022-10-18

## 2022-10-18 RX ORDER — SODIUM CHLORIDE, SODIUM LACTATE, POTASSIUM CHLORIDE, CALCIUM CHLORIDE 600; 310; 30; 20 MG/100ML; MG/100ML; MG/100ML; MG/100ML
INJECTION, SOLUTION INTRAVENOUS CONTINUOUS
Status: DISCONTINUED | OUTPATIENT
Start: 2022-10-18 | End: 2022-10-18

## 2022-10-18 RX ORDER — LIDOCAINE HYDROCHLORIDE 10 MG/ML
INJECTION, SOLUTION EPIDURAL; INFILTRATION; INTRACAUDAL; PERINEURAL AS NEEDED
Status: DISCONTINUED | OUTPATIENT
Start: 2022-10-18 | End: 2022-10-18 | Stop reason: SURG

## 2022-10-18 RX ORDER — ONDANSETRON 2 MG/ML
4 INJECTION INTRAMUSCULAR; INTRAVENOUS EVERY 6 HOURS PRN
Status: DISCONTINUED | OUTPATIENT
Start: 2022-10-18 | End: 2022-10-18

## 2022-10-18 RX ADMIN — SODIUM CHLORIDE, SODIUM LACTATE, POTASSIUM CHLORIDE, CALCIUM CHLORIDE: 600; 310; 30; 20 INJECTION, SOLUTION INTRAVENOUS at 09:38:00

## 2022-10-18 RX ADMIN — SODIUM CHLORIDE, SODIUM LACTATE, POTASSIUM CHLORIDE, CALCIUM CHLORIDE: 600; 310; 30; 20 INJECTION, SOLUTION INTRAVENOUS at 10:37:00

## 2022-10-18 RX ADMIN — LIDOCAINE HYDROCHLORIDE 50 MG: 10 INJECTION, SOLUTION EPIDURAL; INFILTRATION; INTRACAUDAL; PERINEURAL at 09:41:00

## 2022-10-18 NOTE — OPERATIVE REPORT
[facility] Endoscopy Report      Date of Procedure:  10/18/22      Preoperative Diagnosis:  1. Personal history of adenomatous colon polyps  2. Family history of colon cancer      Postoperative Diagnosis:  1. Colon polyps  2. Colonic diverticulosis      Procedure:    Colonoscopy with polypectomy      Surgeon:  Colten Gutiérrez M.D. Anesthesia:  Monitored anesthesia care  Cecal withdrawal time: 34 minutes  EBL:  Insignificant      Brief History: This is a 67year old female who presents for a surveillance/screening colonoscopy in the setting of a history of adenomatous polyps removed endoscopically almost 10 years prior. The patient's last colonoscopy was 5 1/2 years prior and negative. She has 2 first-degree family members with colorectal cancer (mother and sister). The patient has a history of IBS-D that has responded to low-dose nortriptyline. She is otherwise asymptomatic from a lower gastrointestinal tract standpoint. Technique:  After informed consent, the patient was placed in the left lateral recumbent position. Digital rectal examination revealed no palpable intraluminal abnormalities. An Olympus variable stiffness 190 series HD pediatric colonoscope was inserted into the rectum and advanced under direct vision by following the lumen to the sigmoid colon. The patient was made supine and the instrument advanced to a very tortuous colon to the terminal ileum. The colon was examined upon withdrawal in the left lateral recumbent position. Findings:  The preparation of the colon was very good. The terminal ileum was examined for 5 cm and visually normal.  The ileocecal valve was well preserved. The visualized colonic mucosa from the cecum to the anal verge was normal with an intact vascular pattern. There were #3 polyps seen which were removed as follows:    1.   In the proximal to mid transverse colon there was a 9-10 mm sessile polyp which was cold snare excised and retrieved. 2.  In the proximal sigmoid colon there was an 8-9 mm serrated sessile polyp which was cold snare excised and retrieved. 3.  In the distal sigmoid colon there was a 3 mm sessile polyp which was cold snare excised and retrieved. Inspection of all sites revealed no evidence of ongoing bleeding. There were a few diverticula seen in at least the right and left colon without current signs of complication. There were no other colonic polyps, mass lesions, vascular anomalies or signs of inflammation seen. Retroflexion in the rectum revealed no abnormalities. The procedure was well tolerated without immediate complication. Impression:  1. Colon polyps  2. Uncomplicated diverticulosis right and left colon    Recommendations:  1. High-fiber diet. 2.  Follow-up biopsy results. 3.  Further recommendations pending biopsy and clinical course.         Geronimo Albert MD  10/18/2022

## 2022-11-02 ENCOUNTER — TELEPHONE (OUTPATIENT)
Facility: CLINIC | Age: 72
End: 2022-11-02

## 2022-11-03 NOTE — TELEPHONE ENCOUNTER
Health Maintenance Updated. 5 year colonoscopy recall entered into patient outreach in [address] [place] [address] Rd.   Next colonoscopy will be due 10/18/2027

## 2022-11-03 NOTE — TELEPHONE ENCOUNTER
----- Message from Suki Jackson MD sent at 10/29/2022  3:17 PM CDT -----  I spoke to [facility]. She is feeling well. She had #2 tubular adenomas removed. These were under 1 cm histologically. The third polyp was hyperplastic. We discussed diverticulosis as well. She has a difficult to traverse colon and a family history of colon cancer in her sister. We discussed that a surveillance/screening colonoscopy would normally be considered in 5 years to be dependent on patient preference, functional status and comorbidities. Her nausea has improved with dietary modification. She will follow-up in the office in 1 year or sooner if issues arise. GI RNs: Please enter colonoscopy recall for 5 years.

## 2022-11-04 ENCOUNTER — HOSPITAL ENCOUNTER (OUTPATIENT)
Age: 72
Discharge: ANOTHER HOSPITAL | End: 2022-11-04
Payer: MEDICARE

## 2022-11-04 ENCOUNTER — NURSE TRIAGE (OUTPATIENT)
Dept: FAMILY MEDICINE CLINIC | Facility: CLINIC | Age: 72
End: 2022-11-04

## 2022-11-04 VITALS
HEART RATE: 92 BPM | RESPIRATION RATE: 18 BRPM | SYSTOLIC BLOOD PRESSURE: 130 MMHG | OXYGEN SATURATION: 99 % | TEMPERATURE: 98 F | DIASTOLIC BLOOD PRESSURE: 65 MMHG

## 2022-11-04 DIAGNOSIS — R10.9 ABDOMINAL PAIN WITH RADIATION TO BACK: Primary | ICD-10-CM

## 2022-11-04 PROCEDURE — 99214 OFFICE O/P EST MOD 30 MIN: CPT | Performed by: NURSE PRACTITIONER

## 2022-11-04 PROCEDURE — 93000 ELECTROCARDIOGRAM COMPLETE: CPT | Performed by: NURSE PRACTITIONER

## 2022-11-04 NOTE — ED QUICK NOTES
Per provider recommendation to go to ED for further evaluation of symptoms Leaving IC stable no acute distress noted.

## 2022-11-15 ENCOUNTER — PATIENT MESSAGE (OUTPATIENT)
Facility: CLINIC | Age: 72
End: 2022-11-15

## 2022-11-15 RX ORDER — DICYCLOMINE HYDROCHLORIDE 10 MG/1
10 CAPSULE ORAL 4 TIMES DAILY PRN
Qty: 60 CAPSULE | Refills: 2 | Status: SHIPPED | OUTPATIENT
Start: 2022-11-15 | End: 2022-11-19

## 2022-11-15 RX ORDER — DICYCLOMINE HYDROCHLORIDE 10 MG/1
10 CAPSULE ORAL 4 TIMES DAILY PRN
Qty: 60 CAPSULE | Refills: 2 | Status: SHIPPED | OUTPATIENT
Start: 2022-11-15 | End: 2022-11-15

## 2022-11-15 NOTE — TELEPHONE ENCOUNTER
[facility] [employer] [phone].      I spoke to Hector Reyesma representative    They will fax me the form for the PA for dicyclomine    ID # [id number]

## 2022-11-15 NOTE — TELEPHONE ENCOUNTER
From: [facility]  To: Sierra Navarro MD  Sent: 11/15/2022 3:32 PM CST  Subject: My Dicyclomine 10 mg prescription    I usually have that filled at [facility]. My insurance gives me problems and wants to deny it but each year the pharmacy sends you a message regarding this. I believe you or your staff complete information requested from my insurance company and they roselia me an exception for a year. You should have received a information from [facility] today. Let me know if there is anything else you need from me. Thank you.

## 2022-11-19 ENCOUNTER — OFFICE VISIT (OUTPATIENT)
Dept: FAMILY MEDICINE CLINIC | Facility: CLINIC | Age: 72
End: 2022-11-19
Payer: MEDICARE

## 2022-11-19 VITALS
HEART RATE: 60 BPM | OXYGEN SATURATION: 97 % | TEMPERATURE: 97 F | DIASTOLIC BLOOD PRESSURE: 70 MMHG | WEIGHT: 126 LBS | SYSTOLIC BLOOD PRESSURE: 124 MMHG | HEIGHT: 63 IN | BODY MASS INDEX: 22.32 KG/M2

## 2022-11-19 DIAGNOSIS — R10.9 ABDOMINAL PAIN, UNSPECIFIED ABDOMINAL LOCATION: Primary | ICD-10-CM

## 2022-11-19 DIAGNOSIS — Z12.31 ENCOUNTER FOR SCREENING MAMMOGRAM FOR MALIGNANT NEOPLASM OF BREAST: ICD-10-CM

## 2022-11-19 PROCEDURE — 1126F AMNT PAIN NOTED NONE PRSNT: CPT | Performed by: FAMILY MEDICINE

## 2022-11-19 PROCEDURE — 99213 OFFICE O/P EST LOW 20 MIN: CPT | Performed by: FAMILY MEDICINE

## 2022-11-25 NOTE — TELEPHONE ENCOUNTER
Fax Received from [employer]    \"Dicyclomine HCL 10mg capsule has been approved for formulary exception. Coverage starts 8/24/22 and will end 11/22/23\"    I sent fax to be scanned into Lot[employer]. I called Olivia in [facility] and confirmed that it went through.

## 2022-12-28 PROCEDURE — 87624 HPV HI-RISK TYP POOLED RSLT: CPT | Performed by: CLINICAL MEDICAL LABORATORY

## 2022-12-28 PROCEDURE — 88175 CYTOPATH C/V AUTO FLUID REDO: CPT | Performed by: CLINICAL MEDICAL LABORATORY

## 2022-12-28 PROCEDURE — 88112 CYTOPATH CELL ENHANCE TECH: CPT | Performed by: CLINICAL MEDICAL LABORATORY

## 2022-12-29 ENCOUNTER — LAB REQUISITION (OUTPATIENT)
Dept: LAB | Age: 72
End: 2022-12-29

## 2022-12-29 DIAGNOSIS — Z12.4 ENCOUNTER FOR SCREENING FOR MALIGNANT NEOPLASM OF CERVIX: ICD-10-CM

## 2022-12-29 DIAGNOSIS — Z91.89 OTHER SPECIFIED PERSONAL RISK FACTORS, NOT ELSEWHERE CLASSIFIED: ICD-10-CM

## 2022-12-29 DIAGNOSIS — D07.1 CARCINOMA IN SITU OF VULVA: ICD-10-CM

## 2023-01-28 ENCOUNTER — HOSPITAL ENCOUNTER (OUTPATIENT)
Dept: MAMMOGRAPHY | Facility: HOSPITAL | Age: 73
Discharge: HOME | End: 2023-01-28
Attending: FAMILY MEDICINE
Payer: MEDICARE

## 2023-01-28 DIAGNOSIS — Z12.31 ENCOUNTER FOR SCREENING MAMMOGRAM FOR MALIGNANT NEOPLASM OF BREAST: ICD-10-CM

## 2023-01-28 PROCEDURE — 77063 BREAST TOMOSYNTHESIS BI: CPT | Performed by: FAMILY MEDICINE

## 2023-01-28 PROCEDURE — 77067 SCR MAMMO BI INCL CAD: CPT | Performed by: FAMILY MEDICINE

## 2023-02-03 ENCOUNTER — PATIENT MESSAGE (OUTPATIENT)
Dept: FAMILY MEDICINE CLINIC | Facility: CLINIC | Age: 73
End: 2023-02-03

## 2023-02-03 NOTE — TELEPHONE ENCOUNTER
Brooklyn Us, RN 2/3/2023 1:54 PM CST        ----- Message -----  From: Raheem Mart  Sent: 2/3/2023 9:44 AM CST  To: Dacia Silvestreo  Rn  Subject: Nader Doshi,  I'm due for my routine lab work. You had asked me to send you a message and you would write the order. Would you include checking my Vit D level? Once I know the order is there I'll have my lab work and schedule an appointment to meet with you. Do I need to schedule an appointment for lab work? I want to be fasting so I want to be sure. Thank you!   Ajith Pierce

## 2023-02-08 DIAGNOSIS — E55.9 VITAMIN D DEFICIENCY: ICD-10-CM

## 2023-02-08 DIAGNOSIS — M81.0 OSTEOPOROSIS, UNSPECIFIED OSTEOPOROSIS TYPE, UNSPECIFIED PATHOLOGICAL FRACTURE PRESENCE: ICD-10-CM

## 2023-02-08 DIAGNOSIS — Z00.00 ROUTINE HEALTH MAINTENANCE: Primary | ICD-10-CM

## 2023-02-08 DIAGNOSIS — E78.2 MIXED HYPERLIPIDEMIA: ICD-10-CM

## 2023-02-10 ENCOUNTER — LAB ENCOUNTER (OUTPATIENT)
Dept: LAB | Age: 73
End: 2023-02-10
Attending: FAMILY MEDICINE
Payer: MEDICARE

## 2023-02-10 DIAGNOSIS — E55.9 VITAMIN D DEFICIENCY: ICD-10-CM

## 2023-02-10 DIAGNOSIS — M81.0 OSTEOPOROSIS, UNSPECIFIED OSTEOPOROSIS TYPE, UNSPECIFIED PATHOLOGICAL FRACTURE PRESENCE: ICD-10-CM

## 2023-02-10 DIAGNOSIS — Z00.00 ROUTINE HEALTH MAINTENANCE: ICD-10-CM

## 2023-02-10 DIAGNOSIS — E78.2 MIXED HYPERLIPIDEMIA: ICD-10-CM

## 2023-02-10 LAB
ALBUMIN SERPL-MCNC: 3.9 G/DL (ref 3.4–5)
ALBUMIN/GLOB SERPL: 1.1 {RATIO} (ref 1–2)
ALP LIVER SERPL-CCNC: 89 U/L
ALT SERPL-CCNC: 17 U/L
ANION GAP SERPL CALC-SCNC: 5 MMOL/L (ref 0–18)
AST SERPL-CCNC: 12 U/L (ref 15–37)
BILIRUB SERPL-MCNC: 0.5 MG/DL (ref 0.1–2)
BUN BLD-MCNC: 17 MG/DL (ref 7–18)
BUN/CREAT SERPL: 19.8 (ref 10–20)
CALCIUM BLD-MCNC: 9.3 MG/DL (ref 8.5–10.1)
CHLORIDE SERPL-SCNC: 111 MMOL/L (ref 98–112)
CHOLEST SERPL-MCNC: 139 MG/DL (ref ?–200)
CO2 SERPL-SCNC: 28 MMOL/L (ref 21–32)
CREAT BLD-MCNC: 0.86 MG/DL
DEPRECATED RDW RBC AUTO: 45.1 FL (ref 35.1–46.3)
ERYTHROCYTE [DISTWIDTH] IN BLOOD BY AUTOMATED COUNT: 12.9 % (ref 11–15)
FASTING PATIENT LIPID ANSWER: YES
FASTING STATUS PATIENT QL REPORTED: YES
GFR SERPLBLD BASED ON 1.73 SQ M-ARVRAT: 71 ML/MIN/1.73M2 (ref 60–?)
GLOBULIN PLAS-MCNC: 3.4 G/DL (ref 2.8–4.4)
GLUCOSE BLD-MCNC: 91 MG/DL (ref 70–99)
HCT VFR BLD AUTO: 40.7 %
HDLC SERPL-MCNC: 59 MG/DL (ref 40–59)
HGB BLD-MCNC: 13.1 G/DL
LDLC SERPL CALC-MCNC: 62 MG/DL (ref ?–100)
MCH RBC QN AUTO: 31.1 PG (ref 26–34)
MCHC RBC AUTO-ENTMCNC: 32.2 G/DL (ref 31–37)
MCV RBC AUTO: 96.7 FL
NONHDLC SERPL-MCNC: 80 MG/DL (ref ?–130)
OSMOLALITY SERPL CALC.SUM OF ELEC: 299 MOSM/KG (ref 275–295)
PLATELET # BLD AUTO: 275 10(3)UL (ref 150–450)
POTASSIUM SERPL-SCNC: 3.9 MMOL/L (ref 3.5–5.1)
PROT SERPL-MCNC: 7.3 G/DL (ref 6.4–8.2)
RBC # BLD AUTO: 4.21 X10(6)UL
SODIUM SERPL-SCNC: 144 MMOL/L (ref 136–145)
TRIGL SERPL-MCNC: 96 MG/DL (ref 30–149)
VIT D+METAB SERPL-MCNC: 56.3 NG/ML (ref 30–100)
VLDLC SERPL CALC-MCNC: 14 MG/DL (ref 0–30)
WBC # BLD AUTO: 7.4 X10(3) UL (ref 4–11)

## 2023-02-10 PROCEDURE — 82306 VITAMIN D 25 HYDROXY: CPT

## 2023-02-10 PROCEDURE — 36415 COLL VENOUS BLD VENIPUNCTURE: CPT

## 2023-02-10 PROCEDURE — 80061 LIPID PANEL: CPT

## 2023-02-10 PROCEDURE — 80053 COMPREHEN METABOLIC PANEL: CPT

## 2023-02-10 PROCEDURE — 85027 COMPLETE CBC AUTOMATED: CPT

## 2023-02-16 NOTE — PROGRESS NOTES
Noted normal results  [facility] lab results are great this time, if you have any questions please feel free to message me.     Shea Rodrigues MD

## 2023-02-23 ENCOUNTER — OFFICE VISIT (OUTPATIENT)
Dept: FAMILY MEDICINE CLINIC | Facility: CLINIC | Age: 73
End: 2023-02-23

## 2023-02-23 VITALS
SYSTOLIC BLOOD PRESSURE: 127 MMHG | TEMPERATURE: 98 F | RESPIRATION RATE: 18 BRPM | HEART RATE: 81 BPM | BODY MASS INDEX: 23 KG/M2 | WEIGHT: 128.81 LBS | DIASTOLIC BLOOD PRESSURE: 72 MMHG

## 2023-02-23 DIAGNOSIS — R25.2 LEG CRAMPING: ICD-10-CM

## 2023-02-23 DIAGNOSIS — E78.2 MIXED HYPERLIPIDEMIA: Primary | ICD-10-CM

## 2023-02-23 PROCEDURE — 99212 OFFICE O/P EST SF 10 MIN: CPT | Performed by: FAMILY MEDICINE

## 2023-02-23 RX ORDER — ZOLPIDEM TARTRATE 5 MG/1
5 TABLET ORAL NIGHTLY PRN
Qty: 14 TABLET | Refills: 1 | Status: SHIPPED | OUTPATIENT
Start: 2023-02-23

## 2023-02-23 RX ORDER — ZOLPIDEM TARTRATE 5 MG/1
5 TABLET ORAL NIGHTLY PRN
Qty: 30 TABLET | Refills: 1 | Status: CANCELLED | OUTPATIENT
Start: 2023-02-23

## 2023-02-23 NOTE — TELEPHONE ENCOUNTER
Noted and appreciated. Detail Level: Detailed Quality 265: Biopsy Follow-Up: Biopsy results reviewed, communicated, tracked, and documented Quality 130: Documentation Of Current Medications In The Medical Record: Current Medications Documented Quality 111:Pneumonia Vaccination Status For Older Adults: Pneumococcal vaccine (PPSV23) administered on or after patientâs 60th birthday and before the end of the measurement period

## 2023-03-04 ENCOUNTER — NURSE TRIAGE (OUTPATIENT)
Dept: FAMILY MEDICINE CLINIC | Facility: CLINIC | Age: 73
End: 2023-03-04

## 2023-03-06 ENCOUNTER — OFFICE VISIT (OUTPATIENT)
Dept: FAMILY MEDICINE CLINIC | Facility: CLINIC | Age: 73
End: 2023-03-06

## 2023-03-06 VITALS
DIASTOLIC BLOOD PRESSURE: 67 MMHG | SYSTOLIC BLOOD PRESSURE: 128 MMHG | BODY MASS INDEX: 23 KG/M2 | HEART RATE: 60 BPM | WEIGHT: 130 LBS | RESPIRATION RATE: 16 BRPM | OXYGEN SATURATION: 98 %

## 2023-03-06 DIAGNOSIS — M54.50 ACUTE BILATERAL LOW BACK PAIN WITHOUT SCIATICA: Primary | ICD-10-CM

## 2023-03-06 DIAGNOSIS — M25.572 PAIN IN LEFT ANKLE AND JOINTS OF LEFT FOOT: ICD-10-CM

## 2023-03-06 PROCEDURE — 99214 OFFICE O/P EST MOD 30 MIN: CPT | Performed by: FAMILY MEDICINE

## 2023-03-06 PROCEDURE — 1125F AMNT PAIN NOTED PAIN PRSNT: CPT | Performed by: FAMILY MEDICINE

## 2023-03-07 ENCOUNTER — HOSPITAL ENCOUNTER (OUTPATIENT)
Dept: GENERAL RADIOLOGY | Facility: HOSPITAL | Age: 73
Discharge: HOME | End: 2023-03-07
Attending: FAMILY MEDICINE
Payer: MEDICARE

## 2023-03-07 DIAGNOSIS — M25.572 PAIN IN LEFT ANKLE AND JOINTS OF LEFT FOOT: ICD-10-CM

## 2023-03-07 PROCEDURE — 73630 X-RAY EXAM OF FOOT: CPT | Performed by: FAMILY MEDICINE

## 2023-03-07 PROCEDURE — 73610 X-RAY EXAM OF ANKLE: CPT | Performed by: FAMILY MEDICINE

## 2023-03-07 RX ORDER — ROSUVASTATIN CALCIUM 5 MG/1
5 TABLET, COATED ORAL NIGHTLY
Qty: 90 TABLET | Refills: 3 | OUTPATIENT
Start: 2023-03-07

## 2023-03-07 NOTE — TELEPHONE ENCOUNTER
Refill passed per [facility] protocol. .  Requested Prescriptions   Pending Prescriptions Disp Refills    ROSUVASTATIN 5 MG Oral Tab [Pharmacy Med Name: ROSUVASTATIN CALCIUM 5 MG T 5 Tablet] 90 tablet 3     Sig: Take 1 tablet (5 mg total) by mouth nightly.        Cholesterol Medication Protocol Passed - 3/6/2023  6:52 PM        Passed - ALT in past 12 months        Passed - LDL in past 12 months        Passed - Last ALT < 80     Lab Results   Component Value Date    ALT 17 02/10/2023             Passed - Last LDL < 130     Lab Results   Component Value Date    LDL 62 02/10/2023             Passed - In person appointment or virtual visit in the past 12 mos or appointment in next 3 mos     Recent Outpatient Visits              Today Acute bilateral low back [facility] without sciatica    [facility], Kathleen Nieves MD    Office Visit    1 week ago Mixed hyperlipidemia    [address] [facility], [facility] Laurent Yanes MD    Office Visit    3 months ago Abdominal pain, unspecified abdominal location    [facility], [address], [facility] Laurent Yanes MD    Office Visit    7 months ago Mixed hyperlipidemia    [address] [facility], Donald Rivera MD    Office Visit    10 months ago Family history of colon cancer    [facility], Yancey Ramp, MD    Office Visit          Future Appointments         Provider Department Appt Notes    Tomorrow [facility] OF THE [address] Third Age 2025 [facility] qa dd What is wrong with my foot and ankle    In 1 month Elmo Crandall, MD [facility], [address], [facility] annual [employer] wellness exam                    Recent Outpatient Visits              Today Acute bilateral low back pain without sciatica    [facility], Kelsey Platts, MD    Office Visit    1 week ago Mixed hyperlipidemia    [facility], [address] Angelita Campbell MD    Office Visit    3 months ago Abdominal pain, unspecified abdominal location    [address], [facility], [facility] Angelita Campbell MD    Office Visit    7 months ago Mixed hyperlipidemia    [facility], Matt Sanchez MD    Office Visit    10 months ago Family history of colon cancer    Kacie Ng MD    Office Visit            Future Appointments         Provider Department Appt Notes    Tomorrow [facility] OF THE [place] [facility] [place] [address] 2025 [facility] qa dd What is wrong with my foot and ankle    In 1 month MD Gus Crews, [facility] annual [place] wellness exam

## 2023-03-07 NOTE — TELEPHONE ENCOUNTER
Pt's MyChart noted. Per yesterday's Office Visit with Dr. Dexter Read:   rosuvastatin 5 MG Oral Tab Take 1 tablet (5 mg total) by mouth nightly. (Patient taking differently: Take 1 tablet (5 mg total) by mouth every other day.)     Pt says she does not need a refill. RN declining Refill Request for now.

## 2023-03-08 ENCOUNTER — PATIENT MESSAGE (OUTPATIENT)
Dept: FAMILY MEDICINE CLINIC | Facility: CLINIC | Age: 73
End: 2023-03-08

## 2023-03-08 DIAGNOSIS — M75.80 BONE SPUR OF ACROMIOCLAVICULAR JOINT, UNSPECIFIED LATERALITY: ICD-10-CM

## 2023-03-08 DIAGNOSIS — M19.90 ARTHRITIS: Primary | ICD-10-CM

## 2023-03-08 DIAGNOSIS — M77.9 BONE SPUR: ICD-10-CM

## 2023-03-09 NOTE — TELEPHONE ENCOUNTER
From: Nora Reading  To: Benny Tran MD  Sent: 3/8/2023  4:41 PM CST  Subject: Xray results    Dr Jaya Thompson,  I read your message about my foot/ankle xray and there is arthritis and bone spurs. As for a podiatrist, I haven't had good luck with them. I'm not sure how I feel about it. If I wanted to see a podiatrist who would you recommend? [facility]      Referral pended.

## 2023-04-20 ENCOUNTER — OFFICE VISIT (OUTPATIENT)
Dept: FAMILY MEDICINE CLINIC | Facility: CLINIC | Age: 73
End: 2023-04-20

## 2023-04-20 VITALS
BODY MASS INDEX: 22.5 KG/M2 | SYSTOLIC BLOOD PRESSURE: 120 MMHG | HEIGHT: 63 IN | HEART RATE: 79 BPM | DIASTOLIC BLOOD PRESSURE: 74 MMHG | WEIGHT: 127 LBS

## 2023-04-20 DIAGNOSIS — Z00.00 ENCOUNTER FOR ANNUAL HEALTH EXAMINATION: Primary | ICD-10-CM

## 2023-05-09 ENCOUNTER — PATIENT MESSAGE (OUTPATIENT)
Dept: FAMILY MEDICINE CLINIC | Facility: CLINIC | Age: 73
End: 2023-05-09

## 2023-05-10 NOTE — TELEPHONE ENCOUNTER
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 I had the first two initial Moderna vaccines in 2021 and then I have had three boosters all Moderna  1. 11/1/21  2. 4/4/22  3. 9/26/22  Do you think I should go ahead and get another booster? 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 The patients all wear masks as well. I'm willing to get another one just want to be sure it's the best thing for me to do. Let me know your thougts.   Take care,  Nando Beasley

## 2023-06-18 NOTE — TELEPHONE ENCOUNTER
Prior Authorization Needed:    •  Dicyclomine HCl 10 MG Oral Cap, Take 1 capsule (10 mg total) by mouth 4 (four) times daily as needed. , Disp: 60 capsule, Rfl: 2    Message: Plan does not cover medication.  Please call plan at [phone] to initiate prior Yes, get tested

## 2023-08-01 RX ORDER — NORTRIPTYLINE HYDROCHLORIDE 10 MG/1
10 CAPSULE ORAL NIGHTLY
Qty: 90 CAPSULE | Refills: 1 | Status: SHIPPED | OUTPATIENT
Start: 2023-08-01

## 2023-08-01 NOTE — TELEPHONE ENCOUNTER
Requested Prescriptions     Pending Prescriptions Disp Refills    NORTRIPTYLINE 10 MG Oral Cap [Pharmacy Med Name: NORTRIPTYLINE HCL 10 MG CAP 10 Capsule] 90 capsule 1     Sig: TAKE 1 CAPSULE (10 MG TOTAL) BY MOUTH NIGHTLY.      LOV: 04/28/2022  Last Refill: 09/13/2022

## 2023-08-23 PROCEDURE — 87624 HPV HI-RISK TYP POOLED RSLT: CPT | Performed by: CLINICAL MEDICAL LABORATORY

## 2023-08-24 ENCOUNTER — PATIENT MESSAGE (OUTPATIENT)
Dept: FAMILY MEDICINE CLINIC | Facility: CLINIC | Age: 73
End: 2023-08-24

## 2023-08-24 ENCOUNTER — LAB REQUISITION (OUTPATIENT)
Dept: LAB | Age: 73
End: 2023-08-24

## 2023-08-24 DIAGNOSIS — D07.1 CARCINOMA IN SITU OF VULVA: ICD-10-CM

## 2023-08-24 DIAGNOSIS — Z00.00 ROUTINE HEALTH MAINTENANCE: Primary | ICD-10-CM

## 2023-08-24 DIAGNOSIS — Z91.89 OTHER SPECIFIED PERSONAL RISK FACTORS, NOT ELSEWHERE CLASSIFIED: ICD-10-CM

## 2023-08-24 DIAGNOSIS — R92.1 MAMMOGRAPHIC CALCIFICATION FOUND ON DIAGNOSTIC IMAGING OF BREAST: ICD-10-CM

## 2023-08-24 LAB
HPV16+18+45 E6+E7MRNA CVX NAA+PROBE: NEGATIVE
Lab: NORMAL

## 2023-09-11 ENCOUNTER — LAB ENCOUNTER (OUTPATIENT)
Dept: LAB | Age: 73
End: 2023-09-11
Attending: FAMILY MEDICINE
Payer: MEDICARE

## 2023-09-11 DIAGNOSIS — Z00.00 ROUTINE HEALTH MAINTENANCE: ICD-10-CM

## 2023-09-11 LAB
ALBUMIN SERPL-MCNC: 3.8 G/DL (ref 3.4–5)
ALBUMIN/GLOB SERPL: 1 {RATIO} (ref 1–2)
ALP LIVER SERPL-CCNC: 104 U/L
ALT SERPL-CCNC: 23 U/L
ANION GAP SERPL CALC-SCNC: 7 MMOL/L (ref 0–18)
AST SERPL-CCNC: 18 U/L (ref 15–37)
BASOPHILS # BLD AUTO: 0.06 X10(3) UL (ref 0–0.2)
BASOPHILS NFR BLD AUTO: 0.7 %
BILIRUB SERPL-MCNC: 0.5 MG/DL (ref 0.1–2)
BUN BLD-MCNC: 16 MG/DL (ref 7–18)
BUN/CREAT SERPL: 17 (ref 10–20)
CALCIUM BLD-MCNC: 9 MG/DL (ref 8.5–10.1)
CHLORIDE SERPL-SCNC: 110 MMOL/L (ref 98–112)
CHOLEST SERPL-MCNC: 178 MG/DL (ref ?–200)
CO2 SERPL-SCNC: 26 MMOL/L (ref 21–32)
CREAT BLD-MCNC: 0.94 MG/DL
DEPRECATED RDW RBC AUTO: 44.9 FL (ref 35.1–46.3)
EGFRCR SERPLBLD CKD-EPI 2021: 64 ML/MIN/1.73M2 (ref 60–?)
EOSINOPHIL # BLD AUTO: 0.18 X10(3) UL (ref 0–0.7)
EOSINOPHIL NFR BLD AUTO: 2.2 %
ERYTHROCYTE [DISTWIDTH] IN BLOOD BY AUTOMATED COUNT: 12.4 % (ref 11–15)
FASTING PATIENT LIPID ANSWER: YES
FASTING STATUS PATIENT QL REPORTED: YES
GLOBULIN PLAS-MCNC: 3.7 G/DL (ref 2.8–4.4)
GLUCOSE BLD-MCNC: 93 MG/DL (ref 70–99)
HCT VFR BLD AUTO: 42.9 %
HDLC SERPL-MCNC: 60 MG/DL (ref 40–59)
HGB BLD-MCNC: 13.7 G/DL
IMM GRANULOCYTES # BLD AUTO: 0.01 X10(3) UL (ref 0–1)
IMM GRANULOCYTES NFR BLD: 0.1 %
LDLC SERPL CALC-MCNC: 97 MG/DL (ref ?–100)
LYMPHOCYTES # BLD AUTO: 4.18 X10(3) UL (ref 1–4)
LYMPHOCYTES NFR BLD AUTO: 50 %
MCH RBC QN AUTO: 31.4 PG (ref 26–34)
MCHC RBC AUTO-ENTMCNC: 31.9 G/DL (ref 31–37)
MCV RBC AUTO: 98.2 FL
MONOCYTES # BLD AUTO: 0.47 X10(3) UL (ref 0.1–1)
MONOCYTES NFR BLD AUTO: 5.6 %
NEUTROPHILS # BLD AUTO: 3.46 X10 (3) UL (ref 1.5–7.7)
NEUTROPHILS # BLD AUTO: 3.46 X10(3) UL (ref 1.5–7.7)
NEUTROPHILS NFR BLD AUTO: 41.4 %
NONHDLC SERPL-MCNC: 118 MG/DL (ref ?–130)
OSMOLALITY SERPL CALC.SUM OF ELEC: 297 MOSM/KG (ref 275–295)
PLATELET # BLD AUTO: 268 10(3)UL (ref 150–450)
POTASSIUM SERPL-SCNC: 4.3 MMOL/L (ref 3.5–5.1)
PROT SERPL-MCNC: 7.5 G/DL (ref 6.4–8.2)
RBC # BLD AUTO: 4.37 X10(6)UL
SODIUM SERPL-SCNC: 143 MMOL/L (ref 136–145)
TRIGL SERPL-MCNC: 120 MG/DL (ref 30–149)
VLDLC SERPL CALC-MCNC: 20 MG/DL (ref 0–30)
WBC # BLD AUTO: 8.4 X10(3) UL (ref 4–11)

## 2023-09-11 PROCEDURE — 80061 LIPID PANEL: CPT

## 2023-09-11 PROCEDURE — 36415 COLL VENOUS BLD VENIPUNCTURE: CPT

## 2023-09-11 PROCEDURE — 80053 COMPREHEN METABOLIC PANEL: CPT

## 2023-09-11 PROCEDURE — 85025 COMPLETE CBC W/AUTO DIFF WBC: CPT

## 2023-09-18 ENCOUNTER — PATIENT MESSAGE (OUTPATIENT)
Dept: FAMILY MEDICINE CLINIC | Facility: CLINIC | Age: 73
End: 2023-09-18

## 2023-09-19 NOTE — TELEPHONE ENCOUNTER
Dr. Jonathan Hunter, I'll tell her we recommend RSV and next covid vaccine. Does she need another pneumonia?    For pneumonia she's had:  Prevar 13 4/19/22  Pneumovax 23 10/9/17, 10/8/13, 9/24/2009

## 2023-09-19 NOTE — TELEPHONE ENCOUNTER
From: Calixto Hernandez  To: Yumiko Méndez  Sent: 9/18/2023 6:57 PM CDT  Subject: Vaccinations    Dr Torri Singh,  I made an appointment for October but I don't think I really need it. I have questions on current vaccinations available. I had the flu shot on 9/15/23. My questions are:  1. Do I need the pneumonia 20? I had the pneumococcal vaccine 13 valent, 4/19/22. 2. Do I need the RSV?  3. I plan on getting the latest covid when it's available. I never had the one we had talked about earlier. I have had 3 covid boosters plus the 2 initial injections. It seems every day there's another vaccine to consider. If you think I need to come and talk about it I will. Let me know your thoughts. Take care!   Boom Rider

## 2023-10-13 ENCOUNTER — TELEPHONE (OUTPATIENT)
Facility: CLINIC | Age: 73
End: 2023-10-13

## 2023-10-13 NOTE — TELEPHONE ENCOUNTER
Current Outpatient Medications   Medication Sig Dispense Refill    dicyclomine 10 MG Oral Cap Take 1 capsule (10 mg total) by mouth 4 (four) times daily as needed.  60 capsule 2         PA is about to     Hanson: Chantelle Cortez

## 2023-10-13 NOTE — TELEPHONE ENCOUNTER
PPD    Medication PA Requested: Dicyclomine  Pt Insurance/Number to Contact: [employer] ID# and Group: [id number]  Dx: abdominal pain

## 2023-10-16 ENCOUNTER — OFFICE VISIT (OUTPATIENT)
Facility: CLINIC | Age: 73
End: 2023-10-16

## 2023-10-16 VITALS
SYSTOLIC BLOOD PRESSURE: 132 MMHG | BODY MASS INDEX: 23.04 KG/M2 | HEIGHT: 63 IN | DIASTOLIC BLOOD PRESSURE: 73 MMHG | WEIGHT: 130 LBS | HEART RATE: 70 BPM

## 2023-10-16 DIAGNOSIS — K31.7 GASTRIC POLYPS: ICD-10-CM

## 2023-10-16 DIAGNOSIS — K21.9 GASTROESOPHAGEAL REFLUX DISEASE WITHOUT ESOPHAGITIS: Primary | ICD-10-CM

## 2023-10-16 DIAGNOSIS — Z86.010 PERSONAL HISTORY OF COLONIC POLYPS: ICD-10-CM

## 2023-10-16 DIAGNOSIS — Z80.0 FAMILY HISTORY OF COLON CANCER: ICD-10-CM

## 2023-10-16 PROCEDURE — 1126F AMNT PAIN NOTED NONE PRSNT: CPT | Performed by: INTERNAL MEDICINE

## 2023-10-16 PROCEDURE — 99213 OFFICE O/P EST LOW 20 MIN: CPT | Performed by: INTERNAL MEDICINE

## 2023-10-16 RX ORDER — AMOXICILLIN 250 MG
CAPSULE ORAL
COMMUNITY

## 2023-10-16 NOTE — PATIENT INSTRUCTIONS
1.  Continue Zegerid every other day alternating with dicyclomine. 2.  May continue Pepcid [facility]. 3.  May use the Pepto-Bismol if needed. 4.  Continue the nortriptyline. 5.  Follow-up office visit in 1 year or sooner if issues arise.

## 2023-10-17 NOTE — TELEPHONE ENCOUNTER
LENNY MOORE Case ID: [id number]  Outcome  Approvedtoday  Your request was approved based on the initial information provided at the time of the coverage request submission. Please allow additional time for the final decision to be made and added to the patient's account.   Drug  Dicyclomine HCl 10MG capsules  Form  [employer] Medicare Electronic PA Form (4844 NCPDP)

## 2023-10-17 NOTE — TELEPHONE ENCOUNTER
I spoke to the patient, /name verified. No info of Rx BIN and PCn from medicare card. She was able to provide insurance info. That she has [employer] part [employer]. PA for dicyclomine sent thru cover my meds.

## 2023-10-18 NOTE — TELEPHONE ENCOUNTER
Received approval letter from [address] [place] [address] coverage is from 7/19/2023-10/17/2024.   Case # [id number]

## 2023-10-25 ENCOUNTER — OFFICE VISIT (OUTPATIENT)
Dept: FAMILY MEDICINE CLINIC | Facility: CLINIC | Age: 73
End: 2023-10-25

## 2023-10-25 VITALS
BODY MASS INDEX: 23.57 KG/M2 | OXYGEN SATURATION: 96 % | DIASTOLIC BLOOD PRESSURE: 77 MMHG | WEIGHT: 133 LBS | RESPIRATION RATE: 16 BRPM | HEIGHT: 63 IN | SYSTOLIC BLOOD PRESSURE: 126 MMHG | TEMPERATURE: 97 F | HEART RATE: 75 BPM

## 2023-10-25 DIAGNOSIS — M25.572 PAIN IN LEFT ANKLE AND JOINTS OF LEFT FOOT: Primary | ICD-10-CM

## 2023-10-25 PROCEDURE — 99213 OFFICE O/P EST LOW 20 MIN: CPT | Performed by: FAMILY MEDICINE

## 2023-10-25 PROCEDURE — 1125F AMNT PAIN NOTED PAIN PRSNT: CPT | Performed by: FAMILY MEDICINE

## 2023-10-27 RX ORDER — DICYCLOMINE HYDROCHLORIDE 10 MG/1
10 CAPSULE ORAL 4 TIMES DAILY PRN
Qty: 120 CAPSULE | Refills: 0 | Status: SHIPPED | OUTPATIENT
Start: 2023-10-27

## 2023-10-27 NOTE — TELEPHONE ENCOUNTER
Requested Prescriptions     Pending Prescriptions Disp Refills    DICYCLOMINE 10 MG Oral Cap [Pharmacy Med Name: dicyclomine 10 mg capsule] 120 capsule 0     Sig: TAKE ONE CAPSULE BY MOUTH FOUR TIMES DAILY AS NEEDED     LOV: 10/16/23  Last Refill: 11/15/22    Routed to provider office on call.

## 2023-10-31 ENCOUNTER — OFFICE VISIT (OUTPATIENT)
Dept: FAMILY MEDICINE CLINIC | Facility: CLINIC | Age: 73
End: 2023-10-31

## 2023-10-31 VITALS
TEMPERATURE: 98 F | DIASTOLIC BLOOD PRESSURE: 75 MMHG | HEART RATE: 93 BPM | WEIGHT: 122 LBS | HEIGHT: 63 IN | BODY MASS INDEX: 21.62 KG/M2 | SYSTOLIC BLOOD PRESSURE: 133 MMHG

## 2023-10-31 DIAGNOSIS — J02.9 PHARYNGITIS, UNSPECIFIED ETIOLOGY: ICD-10-CM

## 2023-10-31 DIAGNOSIS — J01.10 ACUTE NON-RECURRENT FRONTAL SINUSITIS: Primary | ICD-10-CM

## 2023-10-31 PROCEDURE — 99214 OFFICE O/P EST MOD 30 MIN: CPT | Performed by: FAMILY MEDICINE

## 2023-10-31 PROCEDURE — 1125F AMNT PAIN NOTED PAIN PRSNT: CPT | Performed by: FAMILY MEDICINE

## 2023-10-31 RX ORDER — BENZONATATE 200 MG/1
200 CAPSULE ORAL 3 TIMES DAILY PRN
Qty: 30 CAPSULE | Refills: 0 | Status: SHIPPED | OUTPATIENT
Start: 2023-10-31

## 2023-10-31 RX ORDER — AMOXICILLIN AND CLAVULANATE POTASSIUM 875; 125 MG/1; MG/1
1 TABLET, FILM COATED ORAL 2 TIMES DAILY
Qty: 20 TABLET | Refills: 0 | Status: SHIPPED | OUTPATIENT
Start: 2023-10-31 | End: 2023-11-10

## 2023-11-05 ENCOUNTER — PATIENT MESSAGE (OUTPATIENT)
Dept: FAMILY MEDICINE CLINIC | Facility: CLINIC | Age: 73
End: 2023-11-05

## 2023-11-05 DIAGNOSIS — Z12.31 ENCOUNTER FOR SCREENING MAMMOGRAM FOR MALIGNANT NEOPLASM OF BREAST: Primary | ICD-10-CM

## 2023-11-07 NOTE — TELEPHONE ENCOUNTER
From: Shelby Pop  To: Sarah Fox  Sent: 11/5/2023 9:26 PM CST  Subject: Mammogram    I need an order for my yearly mammogram. I'm due anytime after January 28, 2024. Please include 3D mammogram in the order. Thank you.   Joanie Avery

## 2023-11-12 ENCOUNTER — PATIENT MESSAGE (OUTPATIENT)
Dept: FAMILY MEDICINE CLINIC | Facility: CLINIC | Age: 73
End: 2023-11-12

## 2023-11-15 NOTE — TELEPHONE ENCOUNTER
From: Radha Stein  To: Karli Valle  Sent: 11/12/2023 8:21 PM CST  Subject: RSV     Dr Nadya Salvador,  I had a sinus infection and wasn't able to see and saw Justus Her. The put me on antibiotics which I finished last Thursday November 9. My cough has improved and is dry now. No fever, and I feel okay. Do I need to wait till my cough is gone to schedule getting the RSV ?   Nicki Portillo

## 2023-12-05 ENCOUNTER — OFFICE VISIT (OUTPATIENT)
Dept: FAMILY MEDICINE CLINIC | Facility: CLINIC | Age: 73
End: 2023-12-05
Payer: MEDICARE

## 2023-12-05 VITALS
SYSTOLIC BLOOD PRESSURE: 143 MMHG | WEIGHT: 131 LBS | OXYGEN SATURATION: 98 % | TEMPERATURE: 98 F | DIASTOLIC BLOOD PRESSURE: 84 MMHG | BODY MASS INDEX: 23 KG/M2 | HEART RATE: 77 BPM

## 2023-12-05 DIAGNOSIS — J01.90 ACUTE NON-RECURRENT SINUSITIS, UNSPECIFIED LOCATION: Primary | ICD-10-CM

## 2023-12-05 PROCEDURE — 99213 OFFICE O/P EST LOW 20 MIN: CPT | Performed by: FAMILY MEDICINE

## 2023-12-05 RX ORDER — AZITHROMYCIN 250 MG/1
TABLET, FILM COATED ORAL
Qty: 6 TABLET | Refills: 0 | Status: SHIPPED | OUTPATIENT
Start: 2023-12-05 | End: 2023-12-09

## 2024-02-17 DIAGNOSIS — G47.00 INSOMNIA, UNSPECIFIED TYPE: Primary | ICD-10-CM

## 2024-02-19 NOTE — TELEPHONE ENCOUNTER
Please review; protocol failed/ has no protocol    Please see patients MyChart Message     Fidelina CRANDALL [facility] Refills2 days ago       Refills have been requested for the following medications:         zolpidem (AMBIEN) 5 MG Oral Tab [Miriam Chaidez]      Patient Comment: I'm going out of town for 16 days and need a refill on Ambien to cover that time period.        Requested Prescriptions   Pending Prescriptions Disp Refills    zolpidem (AMBIEN) 5 MG Oral Tab 14 tablet 1     Sig: Take 1 tablet (5 mg total) by mouth nightly as needed for Sleep.       Controlled Substance Medication Failed - 2/17/2024  4:23 PM        Failed - This medication is a controlled substance - forward to provider to refill           Recent Outpatient Visits              2 months ago Acute non-recurrent sinusitis, unspecified location    [facility] Miriam Earl MD    Office Visit    3 months ago Acute non-recurrent frontal sinusitis    [facility] Weiler, Colleen M, DO    Office Visit    3 months ago Pain in left ankle and joints of left foot    [facility] Miriam Earl MD    Office Visit    4 months ago Gastroesophageal reflux disease without esophagitis    [facility] Edgard Spangler [facility]    Office Visit    10 months ago Encounter for annual health examination    [facility] [place]    [facility]          Future Appointments         Provider Department Appt Notes    In 3 days [address]

## 2024-02-20 RX ORDER — ZOLPIDEM TARTRATE 5 MG/1
5 TABLET ORAL NIGHTLY PRN
Qty: 14 TABLET | Refills: 1 | Status: SHIPPED | OUTPATIENT
Start: 2024-02-20

## 2024-02-22 ENCOUNTER — HOSPITAL ENCOUNTER (OUTPATIENT)
Dept: MAMMOGRAPHY | Facility: HOSPITAL | Age: 74
Discharge: HOME | End: 2024-02-22
Attending: FAMILY MEDICINE
Payer: MEDICARE

## 2024-02-22 DIAGNOSIS — Z12.31 ENCOUNTER FOR SCREENING MAMMOGRAM FOR MALIGNANT NEOPLASM OF BREAST: ICD-10-CM

## 2024-02-22 PROCEDURE — 77067 SCR MAMMO BI INCL CAD: CPT | Performed by: FAMILY MEDICINE

## 2024-02-22 PROCEDURE — 77063 BREAST TOMOSYNTHESIS BI: CPT | Performed by: FAMILY MEDICINE

## 2024-03-06 PROCEDURE — 87624 HPV HI-RISK TYP POOLED RSLT: CPT | Performed by: CLINICAL MEDICAL LABORATORY

## 2024-03-06 PROCEDURE — 88112 CYTOPATH CELL ENHANCE TECH: CPT | Performed by: CLINICAL MEDICAL LABORATORY

## 2024-03-07 ENCOUNTER — LAB REQUISITION (OUTPATIENT)
Dept: LAB | Age: 74
End: 2024-03-07

## 2024-03-07 DIAGNOSIS — D07.1 CARCINOMA IN SITU OF VULVA: ICD-10-CM

## 2024-03-07 DIAGNOSIS — Z91.89 OTHER SPECIFIED PERSONAL RISK FACTORS, NOT ELSEWHERE CLASSIFIED: ICD-10-CM

## 2024-03-07 LAB
ASR DISCLAIMER: NORMAL
CASE RPRT: NORMAL
CLINICAL INFO: NORMAL
HPV16+18+45 E6+E7MRNA CVX NAA+PROBE: NEGATIVE
Lab: NORMAL
PATH REPORT.FINAL DX SPEC: NORMAL
PATH REPORT.GROSS SPEC: NORMAL

## 2024-03-21 RX ORDER — DICYCLOMINE HYDROCHLORIDE 10 MG/1
10 CAPSULE ORAL 4 TIMES DAILY PRN
Qty: 120 CAPSULE | Refills: 3 | Status: SHIPPED | OUTPATIENT
Start: 2024-03-21

## 2024-03-21 NOTE — TELEPHONE ENCOUNTER
Requested Prescriptions     Pending Prescriptions Disp Refills    dicyclomine 10 MG Oral Cap 120 capsule 0     Sig: Take 1 capsule (10 mg total) by mouth 4 (four) times daily as needed.     LOV: 10/16/2023  Last Refill: 10/27/2023

## 2024-03-27 ENCOUNTER — OFFICE VISIT (OUTPATIENT)
Dept: FAMILY MEDICINE CLINIC | Facility: CLINIC | Age: 74
End: 2024-03-27
Payer: MEDICARE

## 2024-03-27 VITALS
HEART RATE: 76 BPM | SYSTOLIC BLOOD PRESSURE: 122 MMHG | WEIGHT: 132 LBS | BODY MASS INDEX: 23 KG/M2 | DIASTOLIC BLOOD PRESSURE: 79 MMHG

## 2024-03-27 DIAGNOSIS — E78.00 HYPERCHOLESTEREMIA: Primary | ICD-10-CM

## 2024-03-27 PROCEDURE — 99212 OFFICE O/P EST SF 10 MIN: CPT | Performed by: FAMILY MEDICINE

## 2024-03-27 NOTE — PROGRESS NOTES
Fidelina Lombardo is a 73 year old female.  Chief Complaint   Patient presents with    Blood Pressure     Patient saw her OB and her blood pressure was elevated.        HPI:   Patient is a 73-year-old female presents today to check her blood pressure.  Blood pressure 122/79 the same on repeat.  It was high when she saw her gynecologist.  Patient is due to have fasting lipid profile done    Current Outpatient Medications   Medication Sig Dispense Refill    zolpidem (AMBIEN) 5 MG Oral Tab Take 1 tablet (5 mg total) by mouth nightly as needed for Sleep. 14 tablet 1    dicyclomine 10 MG Oral Cap Take 1 capsule (10 mg total) by mouth 4 (four) times daily as needed. 120 capsule 3    Calcium Carbonate Antacid (TUMS OR) Calcium Carbonate Oral Chewable        active      Multiple Vitamins-Minerals (BIOTIN PLUS/CALCIUM/VIT D3) Oral Tab Take by mouth.      nortriptyline 10 MG Oral Cap Take 1 capsule (10 mg total) by mouth nightly. 90 capsule 1    rosuvastatin 5 MG Oral Tab Take 1 tablet (5 mg total) by mouth nightly. (Patient taking differently: Take 1 tablet (5 mg total) by mouth nightly. Every other night) 90 tablet 3    ZINC OR Use as directed 50 mg in the mouth or throat once a week.      Lactobacillus-Inulin (Au [employer] DIG[employer] HEALTH) Oral Cap       Omeprazole-Sodium Bicarbonate  MG Oral Cap Take 20 mg by mouth. Every other day       famoTIDine (PEPCID) 20 MG Oral Tab Take 1 tablet (20 mg total) by mouth daily.      Cyanocobalamin 3000 MCG Oral Cap NERVIDOX (unknown strength)      Coenzyme Q10 (CO Q-10) 200 MG Oral Cap Take  by mouth.      Cholecalciferol (VITAMIN D-3) 5000 UNITS Oral Tab Take 1 tablet (5,000 Units total) by mouth.      vitamin E 400 UNITS Oral Cap Take 1,000 Units by mouth daily.        Past Medical History:   Diagnosis Date    Deep vein thrombosis (HCC)     1970 from birth control    Dysplasia of vagina 2015    surgery done    Esophageal reflux     High cholesterol     History of blood  clots     Osteoporosis     Other and unspecified hyperlipidemia     PONV (postoperative nausea and vomiting)       Past Surgical History:   Procedure Laterality Date    Cataract Left 02/21/2019    implant put in per pt @ [facility]    Cataract Right 10/17/2019    Cholecystectomy  2011    Colonoscopy  2012    Colonoscopy N/A 6/28/2017    Procedure: COLONOSCOPY;  Surgeon: Edgard Spangler MD;  Location: [facility] ENDOSCOPY    Colonoscopy N/A 10/18/2022    Procedure: COLONOSCOPY;  Surgeon: Edgard Spangler MD;  Location: [facility] ENDOSCOPY    Other surgical history  9/25/2012    per NG \"skin from vulva precancerous\"-please specify    Upper gi endoscopy,biopsy  2014      Social History:  Social History     Socioeconomic History    Marital status:    Tobacco Use    Smoking status: Never    Smokeless tobacco: Never   Vaping Use    Vaping Use: Never used   Substance and Sexual Activity    Alcohol use: Yes     Comment: 1-2 glasses monthly    Drug use: No   Other Topics Concern    Caffeine Concern Yes     Comment: coffee, soda        REVIEW OF SYSTEMS:   GENERAL HEALTH: No fevers, chills, sweats, fatigue  VISION: No recent vision problems, blurry vision or double vision  HEENT: No decreased hearing ear pain nasal congestion or sore throat  SKIN: denies any unusual skin lesions or rashes  RESPIRATORY: denies shortness of breath, cough, wheezing  CARDIOVASCULAR: denies chest pain on exertion, palpitations, swelling in feet  GI: denies abdominal pain and denies heartburn, nausea or vomiting  : No Pain on urination, change in the color of urine, discharge, urinating frequently  MUS: No back pain, joint pain, muscle pain  NEURO: denies headaches , anxiety, depression    EXAM:   /79 (BP Location: Left arm, Patient Position: Sitting, Cuff Size: adult)   Pulse 76   Wt 132 lb (59.9 kg)   BMI 23.38 kg/m²   GENERAL: well developed, well nourished,in no apparent distress        ASSESSMENT AND PLAN:   1.  Hypercholesteremia  Patient to come in fasting for lipids.  Blood pressure stable.  - Lipid Panel; Future       The patient indicates understanding of these issues and agrees to the plan.  No follow-ups on file.

## 2024-03-29 ENCOUNTER — LAB ENCOUNTER (OUTPATIENT)
Dept: LAB | Age: 74
End: 2024-03-29
Attending: FAMILY MEDICINE
Payer: MEDICARE

## 2024-03-29 DIAGNOSIS — E78.00 HYPERCHOLESTEREMIA: ICD-10-CM

## 2024-03-29 LAB
CHOLEST SERPL-MCNC: 169 MG/DL (ref ?–200)
FASTING PATIENT LIPID ANSWER: YES
HDLC SERPL-MCNC: 56 MG/DL (ref 40–59)
LDLC SERPL CALC-MCNC: 94 MG/DL (ref ?–100)
NONHDLC SERPL-MCNC: 113 MG/DL (ref ?–130)
TRIGL SERPL-MCNC: 105 MG/DL (ref 30–149)
VLDLC SERPL CALC-MCNC: 17 MG/DL (ref 0–30)

## 2024-03-29 PROCEDURE — 80061 LIPID PANEL: CPT

## 2024-03-29 PROCEDURE — 36415 COLL VENOUS BLD VENIPUNCTURE: CPT

## 2024-04-26 RX ORDER — NORTRIPTYLINE HYDROCHLORIDE 10 MG/1
10 CAPSULE ORAL NIGHTLY
Qty: 90 CAPSULE | Refills: 3 | Status: SHIPPED | OUTPATIENT
Start: 2024-04-26

## 2024-04-26 NOTE — TELEPHONE ENCOUNTER
Dr Rene    Patient needs nortriptyline refilled.    Last OV: 10/16/2023    Last refill: 8/1/2023    D:90       R:1    Order pended, please review and refill    Thanks

## 2024-04-26 NOTE — TELEPHONE ENCOUNTER
Current Outpatient Medications   Medication Sig Dispense Refill    nortriptyline 10 MG Oral Cap Take 1 capsule (10 mg total) by mouth nightly. 90 capsule 1

## 2024-04-29 DIAGNOSIS — G47.00 INSOMNIA, UNSPECIFIED TYPE: ICD-10-CM

## 2024-04-30 NOTE — TELEPHONE ENCOUNTER
Please review; protocol failed. Or has no protocol    Requested Prescriptions   Pending Prescriptions Disp Refills    ZOLPIDEM 5 MG Oral Tab [Pharmacy Med Name: zolpidem 5 mg tablet] 14 tablet 1     Sig: TAKE 1 Tablet BY MOUTH ONCE A DAY AT BEDTIME AS NEEDED FOR SLEEP       Controlled Substance Medication Failed - 4/29/2024  9:25 AM        Failed - This medication is a controlled substance - forward to provider to refill              Recent Outpatient Visits              1 month ago Hypercholesteremia    [facility] Miriam Chaidez MD    Office Visit    4 months ago Acute non-recurrent sinusitis, unspecified location    [facility] Miriam Chaidez MD    Office Visit    6 months ago Acute non-recurrent frontal sinusitis    [facility] Weiler, Colleen M, DO    Office Visit    6 months ago Pain in left ankle and joints of left foot    [facility] Miriam Cahidez MD    Office Visit    6 months ago Gastroesophageal reflux disease [facility] esophag[facility]

## 2024-05-01 RX ORDER — ZOLPIDEM TARTRATE 5 MG/1
5 TABLET ORAL NIGHTLY PRN
Qty: 14 TABLET | Refills: 1 | Status: SHIPPED | OUTPATIENT
Start: 2024-05-01

## 2024-06-27 ENCOUNTER — OFFICE VISIT (OUTPATIENT)
Dept: FAMILY MEDICINE CLINIC | Facility: CLINIC | Age: 74
End: 2024-06-27

## 2024-06-27 ENCOUNTER — LAB ENCOUNTER (OUTPATIENT)
Dept: LAB | Age: 74
End: 2024-06-27
Attending: FAMILY MEDICINE
Payer: MEDICARE

## 2024-06-27 VITALS
WEIGHT: 132 LBS | SYSTOLIC BLOOD PRESSURE: 133 MMHG | DIASTOLIC BLOOD PRESSURE: 70 MMHG | HEART RATE: 62 BPM | HEIGHT: 64 IN | BODY MASS INDEX: 22.53 KG/M2

## 2024-06-27 DIAGNOSIS — Z00.00 ENCOUNTER FOR ANNUAL HEALTH EXAMINATION: Primary | ICD-10-CM

## 2024-06-27 LAB
ALBUMIN SERPL-MCNC: 4.6 G/DL (ref 3.2–4.8)
ALBUMIN/GLOB SERPL: 1.8 {RATIO} (ref 1–2)
ALP LIVER SERPL-CCNC: 105 U/L
ALT SERPL-CCNC: 13 U/L
ANION GAP SERPL CALC-SCNC: 7 MMOL/L (ref 0–18)
AST SERPL-CCNC: 19 U/L (ref ?–34)
BASOPHILS # BLD AUTO: 0.07 X10(3) UL (ref 0–0.2)
BASOPHILS NFR BLD AUTO: 0.8 %
BILIRUB SERPL-MCNC: 0.5 MG/DL (ref 0.2–1.1)
BUN BLD-MCNC: 14 MG/DL (ref 9–23)
BUN/CREAT SERPL: 15.9 (ref 10–20)
CALCIUM BLD-MCNC: 9.5 MG/DL (ref 8.7–10.4)
CHLORIDE SERPL-SCNC: 108 MMOL/L (ref 98–112)
CO2 SERPL-SCNC: 28 MMOL/L (ref 21–32)
CREAT BLD-MCNC: 0.88 MG/DL
DEPRECATED RDW RBC AUTO: 44.3 FL (ref 35.1–46.3)
EGFRCR SERPLBLD CKD-EPI 2021: 69 ML/MIN/1.73M2 (ref 60–?)
EOSINOPHIL # BLD AUTO: 0.12 X10(3) UL (ref 0–0.7)
EOSINOPHIL NFR BLD AUTO: 1.4 %
ERYTHROCYTE [DISTWIDTH] IN BLOOD BY AUTOMATED COUNT: 12.5 % (ref 11–15)
FASTING STATUS PATIENT QL REPORTED: YES
GLOBULIN PLAS-MCNC: 2.6 G/DL (ref 2–3.5)
GLUCOSE BLD-MCNC: 84 MG/DL (ref 70–99)
HCT VFR BLD AUTO: 39.5 %
HGB BLD-MCNC: 13.1 G/DL
IMM GRANULOCYTES # BLD AUTO: 0.01 X10(3) UL (ref 0–1)
IMM GRANULOCYTES NFR BLD: 0.1 %
LYMPHOCYTES # BLD AUTO: 4.57 X10(3) UL (ref 1–4)
LYMPHOCYTES NFR BLD AUTO: 54.8 %
MCH RBC QN AUTO: 32.1 PG (ref 26–34)
MCHC RBC AUTO-ENTMCNC: 33.2 G/DL (ref 31–37)
MCV RBC AUTO: 96.8 FL
MONOCYTES # BLD AUTO: 0.45 X10(3) UL (ref 0.1–1)
MONOCYTES NFR BLD AUTO: 5.4 %
NEUTROPHILS # BLD AUTO: 3.12 X10 (3) UL (ref 1.5–7.7)
NEUTROPHILS # BLD AUTO: 3.12 X10(3) UL (ref 1.5–7.7)
NEUTROPHILS NFR BLD AUTO: 37.5 %
OSMOLALITY SERPL CALC.SUM OF ELEC: 296 MOSM/KG (ref 275–295)
PLATELET # BLD AUTO: 270 10(3)UL (ref 150–450)
POTASSIUM SERPL-SCNC: 3.8 MMOL/L (ref 3.5–5.1)
PROT SERPL-MCNC: 7.2 G/DL (ref 5.7–8.2)
RBC # BLD AUTO: 4.08 X10(6)UL
SODIUM SERPL-SCNC: 143 MMOL/L (ref 136–145)
WBC # BLD AUTO: 8.3 X10(3) UL (ref 4–11)

## 2024-06-27 PROCEDURE — 36415 COLL VENOUS BLD VENIPUNCTURE: CPT | Performed by: FAMILY MEDICINE

## 2024-06-27 PROCEDURE — G0439 PPPS, SUBSEQ VISIT: HCPCS | Performed by: FAMILY MEDICINE

## 2024-06-27 PROCEDURE — 80053 COMPREHEN METABOLIC PANEL: CPT | Performed by: FAMILY MEDICINE

## 2024-06-27 PROCEDURE — 85025 COMPLETE CBC W/AUTO DIFF WBC: CPT | Performed by: FAMILY MEDICINE

## 2024-06-27 NOTE — PROGRESS NOTES
Subjective:   Fidelina Lombardo is a 73 year old female who presents for a Medicare Subsequent Annual Wellness visit (Pt already had Initial Annual Wellness) and scheduled follow up of multiple significant but stable problems.   Patient is a 73-year-old female who presents today for Medicare physical.  Patient is worried about her weight gain thinks it is from her nortriptyline for her IBS.    History/Other:   Fall Risk Assessment:   She has been screened for Falls and is High Risk. Fall Prevention information provided to patient in After Visit Summary.    Do you feel unsteady when standing or walking?: No  Do you worry about falling?: Yes  Have you fallen in the past year?: No     Cognitive Assessment:   She had a completely normal cognitive assessment - see flowsheet entries     Functional Ability/Status:   Fidelina Lombardo has a completely normal functional assessment. See flowsheet for details.        Depression Screening (PHQ-2/PHQ-9): PHQ-2 SCORE: 0  , done 6/17/2024             Advanced Directives:   She has a Living Will on file in [employer]; reviewed and discussed documents with patient (and family/surrogate if present).  She has a Power of  for Health Care on file in [employer].  Not discussed      Patient Active Problem List   Diagnosis    Esophageal reflux    History of colon polyps    Family history of colon cancer    Osteoporosis    Mixed hyperlipidemia    Osteopenia of neck of femur    Vitreous degeneration    Vitreous floaters of left eye    Squamous blepharitis    Punctate keratitis     Allergies:  She is allergic to codeine, hydrocodone, and hydrocodone-acetaminophen.    Current Medications:  Outpatient Medications Marked as Taking for the 6/27/24 encounter (Office Visit) with Miriam Chaidez MD   Medication Sig    zolpidem 5 MG Oral Tab Take 1 tablet (5 mg total) by mouth nightly as needed for Sleep.    nortriptyline 10 MG Oral Cap Take 1 capsule (10 mg total) by mouth nightly.     dicyclomine 10 MG Oral Cap Take 1 capsule (10 mg total) by mouth 4 (four) times daily as needed.    Calcium Carbonate Antacid (TUMS OR) Calcium Carbonate Oral Chewable        active    rosuvastatin 5 MG Oral Tab Take 1 tablet (5 mg total) by mouth nightly. (Patient taking differently: Take 1 tablet (5 mg total) by mouth nightly. Every other night)    Lactobacillus-Inulin (C[employer]URE[employer] DIGESTIVE HEALTH) Oral Cap     famoTIDine (PEPCID) 20 MG Oral Tab Take 1 tablet (20 mg total) by mouth daily.    Coenzyme Q10 (CO Q-10) 200 MG Oral Cap Take  by mouth.    Cholecalciferol (VITAMIN D-3) 5000 UNITS Oral Tab Take 1 tablet (5,000 Units total) by mouth.       Medical History:  She  has a past medical history of Deep vein thrombosis (HCC), Dysplasia of vagina (2015), Esophageal reflux, High cholesterol, History of blood clots, Osteoporosis, Other and unspecified hyperlipidemia, and PONV (postoperative nausea and vomiting).  Surgical History:  She  has a past surgical history that includes cholecystectomy (2011); colonoscopy (2012); upper gi endoscopy,biopsy (2014); colonoscopy (N/A, 6/28/2017); cataract (Left, 02/21/2019); cataract (Right, 10/17/2019); other surgical history (9/25/2012); and colonoscopy (N/A, 10/18/2022).   Family History:  Her family history includes Cancer in an other family member; Colon Cancer in her mother; Colon Cancer (age of onset: 66) in her sister; Heart Disease in her father and paternal grandmother; Hypertension in her mother; Other in her maternal grandfather, maternal grandmother, and paternal grandfather; Ovarian Cancer (age of onset: 69) in her paternal aunt.  Social History:  She  reports that she has never smoked. She has never used smokeless tobacco. She reports current alcohol use. She reports that she does not use drugs.    Tobacco:  She has never smoked tobacco.    CAGE Alcohol Screen:   CAGE screening score of 0 on 6/17/2024, showing low risk of alcohol abuse.      Patient Care  Team:  Miriam Chaidez MD as PCP - General (Family Medicine)    Review of Systems  GENERAL: feels well otherwise  SKIN: denies any unusual skin lesions  EYES: denies blurred vision or double vision  HEENT: denies nasal congestion, sinus pain or ST  LUNGS: denies shortness of breath with exertion  CARDIOVASCULAR: denies chest pain on exertion  GI: denies abdominal pain, denies heartburn  : denies dysuria, vaginal discharge or itching, no complaint of urinary incontinence   MUSCULOSKELETAL: denies back pain  NEURO: denies headaches  PSYCHE: denies depression or anxiety  HEMATOLOGIC: denies hx of anemia  ENDOCRINE: denies thyroid history  ALL/ASTHMA: denies hx of allergy or asthma    Objective:   Physical Exam  General Appearance:  Alert, cooperative, no distress, appears stated age   Head:  Normocephalic, without obvious abnormality, atraumatic   Eyes:  PERRL, conjunctiva/corneas clear, EOM's intact both eyes   Ears:  Normal TM's and external ear canals, both ears   Nose: Nares normal, septum midline,mucosa normal, no drainage or sinus tenderness   Throat: Lips, mucosa, and tongue normal; teeth and gums normal   Neck: Supple, symmetrical, trachea midline, no adenopathy;  thyroid: not enlarged, symmetric, no tenderness/mass/nodules; no carotid bruit or JVD   Back:   Symmetric, no curvature, ROM normal, no CVA tenderness   Lungs:   Clear to auscultation bilaterally, respirations unlabored   Heart:  Regular rate and rhythm, S1 and S2 normal, no murmur, rub, or gallop   Abdomen:   Soft, non-tender, bowel sounds active all four quadrants,  no masses, no organomegaly   Pelvic: Deferred   Extremities: Extremities normal, atraumatic, no cyanosis or edema   Pulses: 2+ and symmetric   Skin: Skin color, texture, turgor normal, no rashes or lesions   Lymph nodes: Cervical, supraclavicular, and axillary nodes normal   Neurologic: Normal       /70 (BP Location: Right arm, Patient Position: Sitting, Cuff Size: adult)    Pulse 62   Ht 5' 4\" (1.626 m)   Wt 132 lb (59.9 kg)   BMI 22.66 kg/m²  Estimated body mass index is 22.66 kg/m² as calculated from the following:    Height as of this encounter: 5' 4\" (1.626 m).    Weight as of this encounter: 132 lb (59.9 kg).    Medicare Hearing Assessment:   Hearing Screening    Time taken: 6/27/2024 12:34 PM  Entry User: Caryn Chacon CMA  Screening Method: Finger Rub  Finger Rub Result: Pass         Visual Acuity:   Right Eye Visual Acuity: Corrected (patient had cataract surgery and implant) Right Eye Chart Acuity: 20/20   Left Eye Visual Acuity: Corrected     Both Eyes Visual Acuity: Corrected Both Eyes Chart Acuity: 20/20   Able To Tolerate Visual Acuity: Yes        Assessment & Plan:   Fidelina Lombardo is a 73 year old female who presents for a Medicare Assessment.     1. Encounter for annual health examination (Primary)  Patient having labs today.  Patient just had lipid profile that was normal 3 months ago we will repeat again in 3 months.  Continue present management.  The patient indicates understanding of these issues and agrees to the plan.  Reinforced healthy diet, lifestyle, and exercise.      Return in 6 months (on 12/27/2024).     Miriam Chaidez MD, 6/27/2024     Supplementary Documentation:   General Health:  In the past six months, have you lost more than 10 pounds without trying?: 2 - No  Has your appetite been poor?: No  Type of Diet: Balanced  How does the patient maintain a good energy level?: Other  How would you describe your daily physical activity?: Moderate  How would you describe your current health state?: Good  How do you maintain positive mental well-being?: Social Interaction;Puzzles;Games;Visiting Friends;Visiting Family  On a scale of 0 to 10, with 0 being no pain and 10 being severe pain, what is your pain level?: 0 - (None)  In the past six months, have you experienced urine leakage?: 0-No  At any time do you feel concerned for the  safety/well-being of yourself and/or your children, in your home or elsewhere?: No  Have you had any immunizations at another office such as Influenza, Hepatitis B, Tetanus, or Pneumococcal?: Yes       Fidelina De Anda Arielaapril's SCREENING SCHEDULE   Tests on this list are recommended by your physician but may not be covered, or covered at this frequency, by your insurer.   Please check with your insurance carrier before scheduling to verify coverage.   PREVENTATIVE SERVICES FREQUENCY &  COVERAGE DETAILS LAST COMPLETION DATE   Diabetes Screening    Fasting Blood Sugar /  Glucose    One screening every 12 months if never tested or if previously tested but not diagnosed with pre-diabetes   One screening every 6 months if diagnosed with pre-diabetes Lab Results   Component Value Date    GLU 93 09/11/2023        Cardiovascular Disease Screening    Lipid Panel  Cholesterol  Lipoprotein (HDL)  Triglycerides Covered every 5 years for all Medicare beneficiaries without apparent signs or symptoms of cardiovascular disease Lab Results   Component Value Date    CHOLEST 169 03/29/2024    HDL 56 03/29/2024    LDL 94 03/29/2024    TRIG 105 03/29/2024         Electrocardiogram (EKG)   Covered if needed at Welcome to Medicare, and non-screening if indicated for medical reasons 11/04/2022      Ultrasound Screening for Abdominal Aortic Aneurysm (AAA) Covered once in a lifetime for one of the following risk factors    Men who are 65-75 years old and have ever smoked    Anyone with a family history -     Colorectal Cancer Screening  Covered for ages 50-85; only need ONE of the following:    Colonoscopy   Covered every 10 years    Covered every 2 years if patient is at high risk or previous colonoscopy was abnormal 10/18/2022    Health Maintenance   Topic Date Due    Colorectal Cancer Screening  10/18/2027       Flexible Sigmoidoscopy   Covered every 4 years -    Fecal Occult Blood Test Covered annually -   Bone Density Screening    Bone  density screening    Covered every 2 years after age 65 if diagnosed with risk of osteoporosis or estrogen deficiency.    Covered yearly for long-term glucocorticoid medication use (Steroids) Last Dexa Scan:    XR DEXA BONE DENSITOMETRY (CPT=77080) 04/04/2022      No recommendations at this time   Pap and Pelvic    Pap   Covered every 2 years for women at normal risk; Annually if at high risk 12/28/2022  No recommendations at this time    Chlamydia Annually if high risk -  No recommendations at this time   Screening Mammogram    Mammogram     Recommend annually for all female patients aged 40 and older    One baseline mammogram covered for patients aged 35-39 02/22/2024    Health Maintenance   Topic Date Due    Mammogram  02/22/2025       Immunizations    Influenza Covered once per flu season  Please get every year 09/15/2023  No recommendations at this time    Pneumococcal Each vaccine ([id number] & [id number]) covered once after 65 Prevnar 13: 04/19/2022    Rvfnsaiqy06: 10/09/2017     No recommendations at this time    Hepatitis B One screening covered for patients with certain risk factors   -  No recommendations at this time    Tetanus Toxoid Not covered by Medicare Part B unless medically necessary (cut with metal); may be covered with your pharmacy prescription benefits -    Tetanus, Diptheria and Pertusis TD and TDaP Not covered by Medicare Part B -  No recommendations at this time    Zoster Not covered by Medicare Part B; may be covered with your pharmacy  prescription benefits 11/04/2009  No recommendations at this time

## 2024-06-27 NOTE — PATIENT INSTRUCTIONS
Fidelina Lombardo's SCREENING SCHEDULE   Tests on this list are recommended by your physician but may not be covered, or covered at this frequency, by your insurer.   Please check with your insurance carrier before scheduling to verify coverage.   PREVENTATIVE SERVICES FREQUENCY &  COVERAGE DETAILS LAST COMPLETION DATE   Diabetes Screening    Fasting Blood Sugar /  Glucose    One screening every 12 months if never tested or if previously tested but not diagnosed with pre-diabetes   One screening every 6 months if diagnosed with pre-diabetes Lab Results   Component Value Date    GLU 93 09/11/2023        Cardiovascular Disease Screening    Lipid Panel  Cholesterol  Lipoprotein (HDL)  Triglycerides Covered every 5 years for all Medicare beneficiaries without apparent signs or symptoms of cardiovascular disease Lab Results   Component Value Date    CHOLEST 169 03/29/2024    HDL 56 03/29/2024    LDL 94 03/29/2024    TRIG 105 03/29/2024         Electrocardiogram (EKG)   Covered if needed at Welcome to Medicare, and non-screening if indicated for medical reasons 11/04/2022      Ultrasound Screening for Abdominal Aortic Aneurysm (AAA) Covered once in a lifetime for one of the following risk factors   • Men who are 65-75 years old and have ever smoked   • Anyone with a family history -     Colorectal Cancer Screening  Covered for ages 50-85; only need ONE of the following:    Colonoscopy   Covered every 10 years    Covered every 2 years if patient is at high risk or previous colonoscopy was abnormal 10/18/2022    Health Maintenance   Topic Date Due   • Colorectal Cancer Screening  10/18/2027       Flexible Sigmoidoscopy   Covered every 4 years -    Fecal Occult Blood Test Covered annually -   Bone Density Screening    Bone density screening    Covered every 2 years after age 65 if diagnosed with risk of osteoporosis or estrogen deficiency.    Covered yearly for long-term glucocorticoid medication use (Steroids) Last Dexa  Scan:    XR DEXA BONE DENSITOMETRY (CPT=77080) 04/04/2022      No recommendations at this time   Pap and Pelvic    Pap   Covered every 2 years for women at normal risk; Annually if at high risk 12/28/2022  No recommendations at this time    Chlamydia Annually if high risk -  No recommendations at this time   Screening Mammogram    Mammogram     Recommend annually for all female patients aged 40 and older    One baseline mammogram covered for patients aged 35-39 02/22/2024    Health Maintenance   Topic Date Due   • Mammogram  02/22/2025       Immunizations    Influenza Covered once per flu season  Please get every year 09/15/2023  No recommendations at this time    Pneumococcal Each vaccine ([id number] & [id number]) covered once after 65 Prevnar 13: 04/19/2022    [id number]: 10/09/2017     No recommendations at this time    Hepatitis B One screening covered for patients with certain risk factors   -  No recommendations at this time    Tetanus Toxoid Not covered by Medicare Part B unless medically necessary (cut with metal); may be covered with your pharmacy prescription benefits -    Tetanus, Diptheria and Pertusis TD and TDaP Not covered by Medicare Part B -  No recommendations at this time    Zoster Not covered by Medicare Part B; may be covered with your pharmacy  prescription benefits 11/04/2009  No recommendations at this time

## 2024-07-22 ENCOUNTER — OFFICE VISIT (OUTPATIENT)
Facility: CLINIC | Age: 74
End: 2024-07-22
Payer: MEDICARE

## 2024-07-22 VITALS
HEART RATE: 66 BPM | BODY MASS INDEX: 22.53 KG/M2 | DIASTOLIC BLOOD PRESSURE: 79 MMHG | HEIGHT: 64 IN | WEIGHT: 132 LBS | SYSTOLIC BLOOD PRESSURE: 132 MMHG

## 2024-07-22 DIAGNOSIS — K21.9 GASTROESOPHAGEAL REFLUX DISEASE WITHOUT ESOPHAGITIS: ICD-10-CM

## 2024-07-22 DIAGNOSIS — Z86.010 PERSONAL HISTORY OF COLONIC POLYPS: ICD-10-CM

## 2024-07-22 DIAGNOSIS — Z80.0 FAMILY HISTORY OF COLON CANCER: ICD-10-CM

## 2024-07-22 DIAGNOSIS — K31.7 GASTRIC POLYPS: ICD-10-CM

## 2024-07-22 DIAGNOSIS — R10.9 ABDOMINAL PAIN, UNSPECIFIED ABDOMINAL LOCATION: Primary | ICD-10-CM

## 2024-07-22 PROCEDURE — 99214 OFFICE O/P EST MOD 30 MIN: CPT | Performed by: INTERNAL MEDICINE

## 2024-07-22 NOTE — PROGRESS NOTES
Subjective:   Patient ID: Fidelina Lombardo is a 73 year old female.    HPI  Fidelina returns in follow-up.  She was last seen in the office in October 2023.     As per previous notes Fidelina has a very longstanding history of intermittent diarrhea described as urgent loose stools secondary to IBS-D.  Symptoms persisted despite fiber supplementation and antispasmodics.  The symptoms were unpredictable which made venturing out of the home difficult.  The patient would also have symptoms at home as well.  Sprue serology was negative.  After discussion the patient was placed on low-dose nortriptyline (10 mg nightly) with excellent symptomatic response.     Fidelina has a history of adenomatous colon polyps and a family history of colon cancer in a first-degree family member (mother) over the age of 60.  Subsequently her sister at the age of 66 was diagnosed with a metastatic adenocarcinoma to the liver with immunochemical staining favoring a colonic primary.  The patient's last colonoscopy in October 2022 revealed #2 subcentimeter tubular adenomas and diverticulosis in the setting of a tortuous colon.  A 5-year surveillance/screening colonoscopy was advised depending on patient preference, functional status and comorbidities (October 2027) sees.     Fidelina also has a longstanding history of gastroesophageal reflux.  She utilizes OTC Zegerid for symptom control.  Discontinuing the Zegerid will result in recurrent symptoms.    The patient underwent #2 endoscopic examinations in 2021 that revealed multiple fundic gland polyps (some greater than 1 cm) without dysplastic changes.  Biopsies for H. pylori were negative.  The patient was advised to take the least amount of acid suppression in order to control reflux symptoms.      At the time of the patient's last visit she noted burning chest discomfort and intermittent nausea when taking the nortriptyline.  She was taking Zegerid every other day alternating with dicyclomine.   Continued symptomatic treatment was advised.     Current history:  Fidelina presents today to discuss \"a bunch of vague symptoms\".  She endorses abdominal fullness upon wakening in the morning even before eating.  She also notes frequent belching which she has not experienced previously.  She has also noted right upper quadrant and right lower quadrant discomfort that is not continuous and not severe.    Fidelina has noted increased appetite and weight gain.  She thought that this was related to the nortriptyline.  She discontinued the nortriptyline about 1 month prior.  Fortunately she has had no recurrent diarrhea.  She has had #2 soft nonformed stools since discontinuing the medication.    Fidelina denies vomiting.  She endorses that her digestive system has been \"slow\" her whole life.  She is currently taking Zegerid every other day and Pepcid as needed at night.  She will alternate the Zegerid with dicyclomine.  Pepto-Bismol gelcaps help her symptoms which she consistently carries with her.    Fidelina tries to be as active as possible, tending to household chores and climbing the stairs as opposed to taking the elevator in her building.  She used to walk 3 miles but no longer does so as she has been concerned about safety issues.       History/Other:   Review of Systems  See above    Wt Readings from Last 7 Encounters:   07/22/24 132 lb (59.9 kg)   06/27/24 132 lb (59.9 kg)   03/27/24 132 lb (59.9 kg)   12/05/23 131 lb (59.4 kg)   10/31/23 122 lb (55.3 kg)   10/25/23 133 lb (60.3 kg)   10/16/23 130 lb (59 kg)         Current Outpatient Medications   Medication Sig Dispense Refill    zolpidem 5 MG Oral Tab Take 1 tablet (5 mg total) by mouth nightly as needed for Sleep. 14 tablet 1    dicyclomine 10 MG Oral Cap Take 1 capsule (10 mg total) by mouth 4 (four) times daily as needed. 120 capsule 3    Calcium Carbonate Antacid (TUMS OR) Calcium Carbonate Oral Chewable        active      rosuvastatin 5 MG Oral Tab Take 1 tablet (5  mg total) by mouth nightly. (Patient taking differently: Take 1 tablet (5 mg total) by mouth nightly. Every other night) 90 tablet 3    ZINC OR Use as directed 50 mg in the mouth or throat once a week.      Lactobacillus-Inulin ([facility] DIGESTIVE HEALTH) Oral Cap       Omeprazole-Sodium Bicarbonate  MG Oral Cap Take 20 mg by mouth. Every other day       famoTIDine (PEPCID) 20 MG Oral Tab Take 1 tablet (20 mg total) by mouth daily.      Cyanocobalamin 3000 MCG Oral Cap NERVIDOX (unknown strength)      Coenzyme Q10 (CO Q-10) 200 MG Oral Cap Take  by mouth.      Cholecalciferol (VITAMIN D-3) 5000 UNITS Oral Tab Take 1 tablet (5,000 Units total) by mouth.      vitamin E 400 UNITS Oral Cap Take 1,000 Units by mouth daily.      nortriptyline 10 MG Oral Cap Take 1 capsule (10 mg total) by mouth nightly. (Patient not taking: Reported on 7/22/2024) 90 capsule 3    Multiple Vitamins-Minerals (BIOTIN PLUS/CALCIUM/VIT D3) Oral Tab Take by mouth. (Patient not taking: Reported on 7/22/2024)       Allergies:  Allergies   Allergen Reactions    Codeine NAUSEA ONLY and UNKNOWN     Other reaction(s): Nausea    Hydrocodone UNKNOWN    Hydrocodone-Acetaminophen NAUSEA ONLY       Objective:   Physical Exam  Vitals and nursing note reviewed.   Constitutional:       General: She is not in acute distress.     Appearance: She is well-developed. She is not ill-appearing, toxic-appearing or diaphoretic.   HENT:      Head: Normocephalic and atraumatic.      Mouth/Throat:      Pharynx: No oropharyngeal exudate.   Eyes:      General: No scleral icterus.     Conjunctiva/sclera: Conjunctivae normal.   Neck:      Thyroid: No thyromegaly.   Cardiovascular:      Rate and Rhythm: Normal rate and regular rhythm.      Heart sounds: Normal heart sounds.   Pulmonary:      Effort: Pulmonary effort is normal. No respiratory distress.      Breath sounds: Normal breath sounds. No wheezing or rales.   Abdominal:      General: Bowel sounds are normal.  There is no distension.      Palpations: Abdomen is soft. There is no mass.      Tenderness: There is no abdominal tenderness. There is no guarding or rebound.   Musculoskeletal:      Cervical back: Neck supple.   Lymphadenopathy:      Cervical: No cervical adenopathy.   Neurological:      Mental Status: She is alert and oriented to person, place, and time.   Psychiatric:         Behavior: Behavior normal.         Component      Latest Ref Rng 6/27/2024   WBC      4.0 - 11.0 x10(3) uL 8.3    RBC      3.80 - 5.30 x10(6)uL 4.08    Hemoglobin      12.0 - 16.0 g/dL 13.1    Hematocrit      35.0 - 48.0 % 39.5    MCV      80.0 - 100.0 fL 96.8    MCH      26.0 - 34.0 pg 32.1    MCHC      31.0 - 37.0 g/dL 33.2    RDW-SD      35.1 - 46.3 fL 44.3    RDW      11.0 - 15.0 % 12.5    Platelet Count      150.0 - 450.0 10(3)uL 270.0    Prelim Neutrophil Abs      1.50 - 7.70 x10 (3) uL 3.12    Neutrophils Absolute      1.50 - 7.70 x10(3) uL 3.12    Lymphocytes Absolute      1.00 - 4.00 x10(3) uL 4.57 (H)    Monocytes Absolute      0.10 - 1.00 x10(3) uL 0.45    Eosinophils Absolute      0.00 - 0.70 x10(3) uL 0.12    Basophils Absolute      0.00 - 0.20 x10(3) uL 0.07    Immature Granulocyte Absolute      0.00 - 1.00 x10(3) uL 0.01    Neutrophils %      % 37.5    Lymphocytes %      % 54.8    Monocytes %      % 5.4    Eosinophils %      % 1.4    Basophils %      % 0.8    Immature Granulocyte %      % 0.1    Glucose      70 - 99 mg/dL 84    Sodium      136 - 145 mmol/L 143    Potassium      3.5 - 5.1 mmol/L 3.8    Chloride      98 - 112 mmol/L 108    Carbon Dioxide, Total      21.0 - 32.0 mmol/L 28.0    ANION GAP      0 - 18 mmol/L 7    BUN      9 - 23 mg/dL 14    CREATININE      0.55 - 1.02 mg/dL 0.88    BUN/CREATININE RATIO      10.0 - 20.0  15.9    CALCIUM      8.7 - 10.4 mg/dL 9.5    CALCULATED OSMOLALITY      275 - 295 mOsm/kg 296 (H)    EGFR      >=60 mL/min/1.73m2 69    ALT (SGPT)      10 - 49 U/L 13    AST (SGOT)      <=34 U/L 19     ALKALINE PHOSPHATASE      55 - 142 U/L 105    Total Bilirubin      0.2 - 1.1 mg/dL 0.5    PROTEIN, TOTAL      5.7 - 8.2 g/dL 7.2    Albumin      3.2 - 4.8 g/dL 4.6    Globulin      2.0 - 3.5 g/dL 2.6    A/G Ratio      1.0 - 2.0  1.8    Patient Fasting for CMP? Yes       Legend:  (H) High    Assessment & Plan:   1. Abdominal pain, unspecified abdominal location    2. Gastroesophageal reflux disease without esophagitis    3. Family history of colon cancer    4. Personal history of colonic polyps    5. Gastric polyps    Fidelina presents with the above-mentioned symptoms which I suspect are related to functional abdominal pain and functional dyspepsia.  I doubt serious structural lesions, however, in light of the patient's concern and family history, we have elected to obtain a CT scan of the abdomen and pelvis.  Fortunately her IBS-D symptoms have not yet recurred off the nortriptyline.  We will monitor closely.  Discontinuing the nortriptyline may have precipitated some of the current abdominal symptoms.  I have asked Fidelina to increase physical activity in an effort to attain weight loss.  Further recommendations pending results of the CT scan and clinical course.  In the interim the patient's medication regime will remain the same.        Meds This Visit:  Requested Prescriptions      No prescriptions requested or ordered in this encounter       Imaging & Referrals:  CT ABDOMEN+PELVIS(CONTRAST ONLY)(CPT=[id number])

## 2024-07-23 ENCOUNTER — TELEPHONE (OUTPATIENT)
Facility: CLINIC | Age: 74
End: 2024-07-23

## 2024-07-23 NOTE — PATIENT INSTRUCTIONS
1.  Continue current medications.  2.  CT scan of the abdomen and pelvis.  3.  Further advice pending this result.

## 2024-07-24 NOTE — TELEPHONE ENCOUNTER
Called and spoke to the patient, date of birth and name verified.    She stated she was able to schedule CT next month.    The patient has no further request.

## 2024-08-07 ENCOUNTER — HOSPITAL ENCOUNTER (OUTPATIENT)
Dept: CT IMAGING | Facility: HOSPITAL | Age: 74
Discharge: HOME | End: 2024-08-07
Attending: INTERNAL MEDICINE
Payer: MEDICARE

## 2024-08-07 DIAGNOSIS — R10.9 ABDOMINAL PAIN, UNSPECIFIED ABDOMINAL LOCATION: ICD-10-CM

## 2024-08-07 LAB
CREAT BLD-MCNC: 0.9 MG/DL
EGFRCR SERPLBLD CKD-EPI 2021: 67 ML/MIN/1.73M2 (ref 60–?)

## 2024-08-07 PROCEDURE — 74177 CT ABD & PELVIS W/CONTRAST: CPT | Performed by: INTERNAL MEDICINE

## 2024-08-07 PROCEDURE — 82565 ASSAY OF CREATININE: CPT

## 2024-08-15 RX ORDER — ROSUVASTATIN CALCIUM 5 MG/1
5 TABLET, COATED ORAL NIGHTLY
Qty: 90 TABLET | Refills: 3 | Status: SHIPPED | OUTPATIENT
Start: 2024-08-15

## 2024-08-15 NOTE — TELEPHONE ENCOUNTER
Refill Per Protocol     Requested Prescriptions   Pending Prescriptions Disp Refills    rosuvastatin 5 MG Oral Tab 90 tablet 3     Sig: Take 1 tablet (5 mg total) by mouth nightly.       Cholesterol Medication Protocol Passed - 8/12/2024 12:19 PM        Passed - ALT < 80     Lab Results   Component Value Date    ALT 13 06/27/2024             Passed - ALT resulted within past year        Passed - Lipid panel within past 12 months     Lab Results   Component Value Date    CHOLEST 169 03/29/2024    TRIG 105 03/29/2024    HDL 56 03/29/2024    LDL 94 03/29/2024    VLDL 17 03/29/2024    NONHDLC 113 03/29/2024             Passed - In person appointment or virtual visit in the past 12 mos or appointment in next 3 mos     Recent Outpatient Visits              3 weeks ago [facility], [facility]    [facility]    1 month ago Encounter for annual health examination    [facility] Miriam Chaidez MD    Office Visit    4 months ago Hypercholesteremia    [facility] [place] Miriam Earl MD    Office Visit    8 months ago Acute non-recurrent sinusitis, unspecified location    [facility] Miriam Chaidez MD    Office Visit    9 months ago Acute non-recurrent frontal sinusitis    [facility], Colleen M, DO    Office Visit                               Recent Outpatient Visits              3 weeks ago Abdominal pain, unspecified abdominal location    [facility]    [facility]    1 month ago Encounter for annual health examination    [facility] [place] Miriam Eral MD    Office Visit    [facility]    [facility]    8 months ago Acute non-recurrent sinusitis, unspecified location    [facility]    [facility] Visit    9 months ago Acute non-recurrent frontal sinusitis    [facility], Colleen M, DO    Office Visit

## 2024-08-16 RX ORDER — ROSUVASTATIN CALCIUM 5 MG/1
5 TABLET, COATED ORAL NIGHTLY
Qty: 90 TABLET | Refills: 3 | OUTPATIENT
Start: 2024-08-16

## 2024-09-18 ENCOUNTER — PATIENT MESSAGE (OUTPATIENT)
Dept: FAMILY MEDICINE CLINIC | Facility: CLINIC | Age: 74
End: 2024-09-18

## 2024-09-20 NOTE — TELEPHONE ENCOUNTER
From: Fidelina Lombardo  To: Miriam Chaidez  Sent: 9/18/2024 4:44 PM CDT  Subject: RSV 2024    Dr Chaidez  I received the RSV vaccine this time last year. Do I need another one this year? I’ve read it’s good for two years.  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  I’ve already received the flu and Covid vaccines this month.  Thank you for your help!  Fidelina Lombardo

## 2024-10-21 NOTE — TELEPHONE ENCOUNTER
DR Chaidez=see below and advice,thanks. Background, Mychart 10/5/2022  7:03 PM CDT    The dermatologist have me Valtrex 1GM tid. Is that a typical dosage? It seems high.
Stop Valtrexc
Class III - visualization of the soft palate and the base of the uvula

## 2024-11-14 ENCOUNTER — PATIENT MESSAGE (OUTPATIENT)
Dept: FAMILY MEDICINE CLINIC | Facility: CLINIC | Age: 74
End: 2024-11-14

## 2024-11-14 DIAGNOSIS — Z00.00 ROUTINE HEALTH MAINTENANCE: Primary | ICD-10-CM

## 2024-11-15 NOTE — TELEPHONE ENCOUNTER
Dr Chaidez, Please  see patient's [employer] message asking if labs are needed prior to 12/19/24 appointment with you.

## 2024-12-11 PROCEDURE — 87624 HPV HI-RISK TYP POOLED RSLT: CPT | Performed by: CLINICAL MEDICAL LABORATORY

## 2024-12-12 ENCOUNTER — LAB REQUISITION (OUTPATIENT)
Dept: LAB | Age: 74
End: 2024-12-12

## 2024-12-12 DIAGNOSIS — Z12.72 ENCOUNTER FOR SCREENING FOR MALIGNANT NEOPLASM OF VAGINA: ICD-10-CM

## 2024-12-12 DIAGNOSIS — D07.1 CARCINOMA IN SITU OF VULVA: ICD-10-CM

## 2024-12-12 LAB
HPV16+18+45 E6+E7MRNA CVX NAA+PROBE: NEGATIVE
Lab: NORMAL

## 2024-12-16 ENCOUNTER — LAB ENCOUNTER (OUTPATIENT)
Dept: LAB | Age: 74
End: 2024-12-16
Attending: FAMILY MEDICINE
Payer: MEDICARE

## 2024-12-16 DIAGNOSIS — Z00.00 ROUTINE HEALTH MAINTENANCE: ICD-10-CM

## 2024-12-16 LAB
ALBUMIN SERPL-MCNC: 4.9 G/DL (ref 3.2–4.8)
ALBUMIN/GLOB SERPL: 1.8 {RATIO} (ref 1–2)
ALP LIVER SERPL-CCNC: 100 U/L
ALT SERPL-CCNC: 16 U/L
ANION GAP SERPL CALC-SCNC: 7 MMOL/L (ref 0–18)
AST SERPL-CCNC: 18 U/L (ref ?–34)
BILIRUB SERPL-MCNC: 0.4 MG/DL (ref 0.2–1.1)
BUN BLD-MCNC: 18 MG/DL (ref 9–23)
BUN/CREAT SERPL: 20.5 (ref 10–20)
CALCIUM BLD-MCNC: 9.9 MG/DL (ref 8.7–10.4)
CHLORIDE SERPL-SCNC: 110 MMOL/L (ref 98–112)
CHOLEST SERPL-MCNC: 174 MG/DL (ref ?–200)
CO2 SERPL-SCNC: 27 MMOL/L (ref 21–32)
CREAT BLD-MCNC: 0.88 MG/DL
DEPRECATED RDW RBC AUTO: 46 FL (ref 35.1–46.3)
EGFRCR SERPLBLD CKD-EPI 2021: 68 ML/MIN/1.73M2 (ref 60–?)
ERYTHROCYTE [DISTWIDTH] IN BLOOD BY AUTOMATED COUNT: 12.6 % (ref 11–15)
FASTING PATIENT LIPID ANSWER: YES
FASTING STATUS PATIENT QL REPORTED: YES
GLOBULIN PLAS-MCNC: 2.7 G/DL (ref 2–3.5)
GLUCOSE BLD-MCNC: 91 MG/DL (ref 70–99)
HCT VFR BLD AUTO: 44 %
HDLC SERPL-MCNC: 59 MG/DL (ref 40–59)
HGB BLD-MCNC: 13.9 G/DL
LDLC SERPL CALC-MCNC: 99 MG/DL (ref ?–100)
MCH RBC QN AUTO: 31.4 PG (ref 26–34)
MCHC RBC AUTO-ENTMCNC: 31.6 G/DL (ref 31–37)
MCV RBC AUTO: 99.5 FL
NONHDLC SERPL-MCNC: 115 MG/DL (ref ?–130)
OSMOLALITY SERPL CALC.SUM OF ELEC: 299 MOSM/KG (ref 275–295)
PLATELET # BLD AUTO: 289 10(3)UL (ref 150–450)
POTASSIUM SERPL-SCNC: 4.3 MMOL/L (ref 3.5–5.1)
PROT SERPL-MCNC: 7.6 G/DL (ref 5.7–8.2)
RBC # BLD AUTO: 4.42 X10(6)UL
SODIUM SERPL-SCNC: 144 MMOL/L (ref 136–145)
TRIGL SERPL-MCNC: 88 MG/DL (ref 30–149)
VLDLC SERPL CALC-MCNC: 15 MG/DL (ref 0–30)
WBC # BLD AUTO: 8.3 X10(3) UL (ref 4–11)

## 2024-12-16 PROCEDURE — 80061 LIPID PANEL: CPT

## 2024-12-16 PROCEDURE — 80053 COMPREHEN METABOLIC PANEL: CPT

## 2024-12-16 PROCEDURE — 85027 COMPLETE CBC AUTOMATED: CPT

## 2024-12-16 PROCEDURE — 36415 COLL VENOUS BLD VENIPUNCTURE: CPT

## 2024-12-19 ENCOUNTER — OFFICE VISIT (OUTPATIENT)
Dept: FAMILY MEDICINE CLINIC | Facility: CLINIC | Age: 74
End: 2024-12-19
Payer: MEDICARE

## 2024-12-19 VITALS
HEIGHT: 63 IN | SYSTOLIC BLOOD PRESSURE: 138 MMHG | DIASTOLIC BLOOD PRESSURE: 84 MMHG | WEIGHT: 133 LBS | BODY MASS INDEX: 23.57 KG/M2 | TEMPERATURE: 97 F | HEART RATE: 70 BPM | OXYGEN SATURATION: 98 %

## 2024-12-19 DIAGNOSIS — E78.00 HYPERCHOLESTEROLEMIA: Primary | ICD-10-CM

## 2024-12-19 PROCEDURE — 99212 OFFICE O/P EST SF 10 MIN: CPT | Performed by: FAMILY MEDICINE

## 2024-12-19 RX ORDER — ROSUVASTATIN CALCIUM 5 MG/1
TABLET, COATED ORAL
Qty: 45 TABLET | Refills: 3 | Status: SHIPPED | OUTPATIENT
Start: 2024-12-19

## 2024-12-19 NOTE — PROGRESS NOTES
Fidelina Lombardo is a 74 year old female.  Chief Complaint   Patient presents with    Lab Results     Here to discuss lab results from 12/16/24.     Medication Follow-Up     Here to discuss rosuvastatin dosage should it be every other day or daily.        HPI:   Patient presents to discuss labs.  Reviewed labs with patient.    Current Outpatient Medications   Medication Sig Dispense Refill    rosuvastatin 5 MG Oral Tab Take one tab po every other day 45 tablet 3    zolpidem 5 MG Oral Tab Take 1 tablet (5 mg total) by mouth nightly as needed for Sleep. 14 tablet 1    nortriptyline 10 MG Oral Cap Take 1 capsule (10 mg total) by mouth nightly. 90 capsule 3    dicyclomine 10 MG Oral Cap Take 1 capsule (10 mg total) by mouth 4 (four) times daily as needed. 120 capsule 3    Calcium Carbonate Antacid (TUMS OR) Calcium Carbonate Oral Chewable        active      ZINC OR Use as directed 50 mg in the mouth or throat once a week.      Lactobacillus-Inulin ([facility] DIGESTIVE HEALTH) Oral Cap       Omeprazole-Sodium Bicarbonate  MG Oral Cap Take 20 mg by mouth. Every other day       famoTIDine (PEPCID) 20 MG Oral Tab Take 1 tablet (20 mg total) by mouth daily.      Cyanocobalamin 3000 MCG Oral Cap NERVIDOX (unknown strength)      Coenzyme Q10 (CO Q-10) 200 MG Oral Cap Take  by mouth.      Cholecalciferol (VITAMIN D-3) 5000 UNITS Oral Tab Take 1 tablet (5,000 Units total) by mouth.      vitamin E 400 UNITS Oral Cap Take 1,000 Units by mouth daily.        Past Medical History:    Deep vein thrombosis (HCC)    1970 from birth control    Dysplasia of vagina    surgery done    Esophageal reflux    High cholesterol    History of blood clots    Osteoporosis    Other and unspecified hyperlipidemia    PONV (postoperative nausea and vomiting)      Past Surgical History:   Procedure Laterality Date    Cataract Left 02/21/2019    implant put in per pt @ [facility]    Cataract Right 10/17/2019    Cholecystectomy  2011     Colonoscopy  2012    Colonoscopy N/A 6/28/2017    Procedure: COLONOSCOPY;  Surgeon: Edgard Spangler MD;  Location: [facility] ENDOSCOPY    Colonoscopy N/A 10/18/2022    Procedure: COLONOSCOPY;  Surgeon: Edgard Spangler MD;  Location: [facility] ENDOSCOPY    Other surgical history  9/25/2012    per NG \"skin from vulva precancerous\"-please specify    Upper gi endoscopy,biopsy  2014      Social History:  Social History     Socioeconomic History    Marital status:    Tobacco Use    Smoking status: Never    Smokeless tobacco: Never   Vaping Use    Vaping status: Never Used   Substance and Sexual Activity    Alcohol use: Yes     Comment: 1-2 glasses monthly    Drug use: No   Other Topics Concern    Caffeine Concern Yes     Comment: coffee, soda        REVIEW OF SYSTEMS:   GENERAL HEALTH: No fevers, chills, sweats, fatigue  VISION: No recent vision problems, blurry vision or double vision  HEENT: No decreased hearing ear pain nasal congestion or sore throat  SKIN: denies any unusual skin lesions or rashes  RESPIRATORY: denies shortness of breath, cough, wheezing  CARDIOVASCULAR: denies chest pain on exertion, palpitations, swelling in feet  GI: denies abdominal pain and denies heartburn, nausea or vomiting  : No Pain on urination, change in the color of urine, discharge, urinating frequently  MUS: No back pain, joint pain, muscle pain  NEURO: denies headaches , anxiety, depression    EXAM:   /84 (BP Location: Left arm, Patient Position: Sitting, Cuff Size: adult)   Pulse 70   Temp 97.3 °F (36.3 °C) (Temporal)   Ht 5' 3\" (1.6 m)   Wt 133 lb (60.3 kg)   SpO2 98%   BMI 23.56 kg/m²   GENERAL: well developed, well nourished,in no apparent distress        ASSESSMENT AND PLAN:   1. Hypercholesterolemia  LDL is 99.  We will continue her atorvastatin every other day.  New prescription written.  Sent to [facility] pharmacy.       The patient indicates understanding of these issues and agrees to the plan.  No  follow-ups on file.

## 2025-01-25 ENCOUNTER — PATIENT MESSAGE (OUTPATIENT)
Dept: FAMILY MEDICINE CLINIC | Facility: CLINIC | Age: 75
End: 2025-01-25

## 2025-01-25 DIAGNOSIS — Z12.31 ENCOUNTER FOR SCREENING MAMMOGRAM FOR MALIGNANT NEOPLASM OF BREAST: Primary | ICD-10-CM

## 2025-03-17 ENCOUNTER — HOSPITAL ENCOUNTER (OUTPATIENT)
Dept: MAMMOGRAPHY | Facility: HOSPITAL | Age: 75
Discharge: HOME | End: 2025-03-17
Attending: FAMILY MEDICINE
Payer: MEDICARE

## 2025-03-17 DIAGNOSIS — Z12.31 ENCOUNTER FOR SCREENING MAMMOGRAM FOR MALIGNANT NEOPLASM OF BREAST: ICD-10-CM

## 2025-03-17 PROCEDURE — 77067 SCR MAMMO BI INCL CAD: CPT | Performed by: FAMILY MEDICINE

## 2025-03-17 PROCEDURE — 77063 BREAST TOMOSYNTHESIS BI: CPT | Performed by: FAMILY MEDICINE

## 2025-04-23 ENCOUNTER — TELEPHONE (OUTPATIENT)
Dept: FAMILY MEDICINE CLINIC | Facility: CLINIC | Age: 75
End: 2025-04-23

## 2025-05-29 ENCOUNTER — OFFICE VISIT (OUTPATIENT)
Dept: FAMILY MEDICINE CLINIC | Facility: CLINIC | Age: 75
End: 2025-05-29
Payer: MEDICARE

## 2025-05-29 VITALS
BODY MASS INDEX: 23.21 KG/M2 | SYSTOLIC BLOOD PRESSURE: 127 MMHG | OXYGEN SATURATION: 96 % | DIASTOLIC BLOOD PRESSURE: 79 MMHG | RESPIRATION RATE: 17 BRPM | WEIGHT: 131 LBS | HEIGHT: 63 IN | HEART RATE: 84 BPM

## 2025-05-29 DIAGNOSIS — R05.9 COUGH, UNSPECIFIED TYPE: Primary | ICD-10-CM

## 2025-05-29 LAB
COVID19 BINAX NOW ANTIGEN: NOT DETECTED
POCT LOT NUMBER: 7272

## 2025-05-29 PROCEDURE — 99213 OFFICE O/P EST LOW 20 MIN: CPT | Performed by: FAMILY MEDICINE

## 2025-05-29 NOTE — PROGRESS NOTES
Fidelina Lombardo is a 74 year old female.  Chief Complaint   Patient presents with    Breathing Problem     Pt reports when taking a deep breath she experiences a cough x1week pt denies fever or wheezing in her chest.       HPI:   Patient reports having a cough especially when she takes a deep breath.  No fever.  COVID test negative.    Current Medications[1]   Past Medical History[2]   Past Surgical History[3]   Social History:  Short Social Hx on File[4]     REVIEW OF SYSTEMS:   GENERAL HEALTH: No fevers, chills, sweats, fatigue  VISION: No recent vision problems, blurry vision or double vision  HEENT: No decreased hearing ear pain nasal congestion or sore throat  SKIN: denies any unusual skin lesions or rashes  RESPIRATORY: denies shortness of breath, cough, wheezing  CARDIOVASCULAR: denies chest pain on exertion, palpitations, swelling in feet  GI: denies abdominal pain and denies heartburn, nausea or vomiting  : No Pain on urination, change in the color of urine, discharge, urinating frequently  MUS: No back pain, joint pain, muscle pain  NEURO: denies headaches , anxiety, depression    EXAM:   /79 (BP Location: Left arm, Patient Position: Sitting, Cuff Size: adult)   Pulse 84   Resp 17   Ht 5' 3\" (1.6 m)   Wt 131 lb (59.4 kg)   SpO2 96%   BMI 23.21 kg/m²   GENERAL: well developed, well nourished,in no apparent distress    ,  LUNGS: clear to auscultation, no wheeze        ASSESSMENT AND PLAN:   1. Cough, unspecified type  Negative COVID test chest exam negative.  Would suggest symptomatic relief only  - POC COVID19 BinaxNOW Antigen       The patient indicates understanding of these issues and agrees to the plan.  No follow-ups on file.       [1]   Current Outpatient Medications   Medication Sig Dispense Refill    rosuvastatin 5 MG Oral Tab Take one tab po every other day 45 tablet 3    zolpidem 5 MG Oral Tab Take 1 tablet (5 mg total) by mouth nightly as needed for Sleep. 14 tablet 1     nortriptyline 10 MG Oral Cap Take 1 capsule (10 mg total) by mouth nightly. 90 capsule 3    dicyclomine 10 MG Oral Cap Take 1 capsule (10 mg total) by mouth 4 (four) times daily as needed. 120 capsule 3    Calcium Carbonate Antacid (TUMS OR) Calcium Carbonate Oral Chewable        active      ZINC OR Use as directed 50 mg in the mouth or throat once a week.      Lactobacillus-Inulin ([facility] DIGESTIVE HEALTH) Oral Cap       Omeprazole-Sodium Bicarbonate  MG Oral Cap Take 20 mg by mouth. Every other day       famoTIDine (PEPCID) 20 MG Oral Tab Take 1 tablet (20 mg total) by mouth daily.      Cyanocobalamin 3000 MCG Oral Cap NERVIDOX (unknown strength)      Coenzyme Q10 (CO Q-10) 200 MG Oral Cap Take  by mouth.      Cholecalciferol (VITAMIN D-3) 5000 UNITS Oral Tab Take 1 tablet (5,000 Units total) by mouth.      vitamin E 400 UNITS Oral Cap Take 1,000 Units by mouth daily.     [2]   Past Medical History:   Deep vein thrombosis (HCC)    1970 from birth control    Dysplasia of vagina    surgery done    Esophageal reflux    High cholesterol    History of blood clots    Osteoporosis    Other and unspecified hyperlipidemia    PONV (postoperative nausea and vomiting)   [3]   Past Surgical History:  Procedure Laterality Date    Cataract Left 02/21/2019    implant put in per pt @ [facility]    Cataract Right 10/17/2019    Cholecystectomy  2011    Colonoscopy  2012    Colonoscopy N/A 6/28/2017    Procedure: COLONOSCOPY;  Surgeon: Edgard Spangler MD;  Location: [facility] ENDOSCOPY    Colonoscopy N/A 10/18/2022    Procedure: COLONOSCOPY;  Surgeon: Edgard Spangler MD;  Location: [facility] ENDOSCOPY    Other surgical history  9/25/2012    per NG \"skin from vulva precancerous\"-please specify    Upper gi endoscopy,biopsy  2014   [4]   Social History  Socioeconomic History    Marital status:    Tobacco Use    Smoking status: Never    Smokeless tobacco: Never   Vaping Use    Vaping status: Never Used   Substance  and Sexual Activity    Alcohol use: Yes     Comment: 1-2 glasses monthly    Drug use: No   Other Topics Concern    Caffeine Concern Yes     Comment: coffee, soda

## 2025-06-04 ENCOUNTER — PATIENT MESSAGE (OUTPATIENT)
Dept: FAMILY MEDICINE CLINIC | Facility: CLINIC | Age: 75
End: 2025-06-04

## 2025-06-09 PROCEDURE — 87624 HPV HI-RISK TYP POOLED RSLT: CPT | Performed by: CLINICAL MEDICAL LABORATORY

## 2025-06-10 ENCOUNTER — LAB REQUISITION (OUTPATIENT)
Dept: LAB | Age: 75
End: 2025-06-10

## 2025-06-10 DIAGNOSIS — D07.1 CARCINOMA IN SITU OF VULVA: ICD-10-CM

## 2025-06-10 LAB
HPV16+18+45 E6+E7MRNA CVX NAA+PROBE: NEGATIVE
SERVICE CMNT-IMP: NORMAL

## 2025-06-23 ENCOUNTER — PATIENT MESSAGE (OUTPATIENT)
Dept: FAMILY MEDICINE CLINIC | Facility: CLINIC | Age: 75
End: 2025-06-23

## 2025-06-23 ENCOUNTER — TELEPHONE (OUTPATIENT)
Dept: FAMILY MEDICINE CLINIC | Facility: CLINIC | Age: 75
End: 2025-06-23

## 2025-06-23 DIAGNOSIS — Z00.00 ANNUAL PHYSICAL EXAM: Primary | ICD-10-CM

## 2025-06-23 NOTE — TELEPHONE ENCOUNTER
Patient states that she has an appointment on Thursday with dr. CLAUDIA Chaidez.  States that she was told that an order was made, yet, that was no order in the system.  Please advise.

## 2025-06-24 ENCOUNTER — LAB ENCOUNTER (OUTPATIENT)
Dept: LAB | Age: 75
End: 2025-06-24
Attending: FAMILY MEDICINE
Payer: MEDICARE

## 2025-06-24 LAB
ALBUMIN SERPL-MCNC: 4.8 G/DL (ref 3.2–4.8)
ALBUMIN/GLOB SERPL: 2.1 {RATIO} (ref 1–2)
ALP LIVER SERPL-CCNC: 98 U/L (ref 55–142)
ALT SERPL-CCNC: 13 U/L (ref 10–49)
ANION GAP SERPL CALC-SCNC: 5 MMOL/L (ref 0–18)
AST SERPL-CCNC: 16 U/L (ref ?–34)
BASOPHILS # BLD AUTO: 0.07 X10(3) UL (ref 0–0.2)
BASOPHILS NFR BLD AUTO: 1 %
BILIRUB SERPL-MCNC: 0.5 MG/DL (ref 0.2–1.1)
BUN BLD-MCNC: 12 MG/DL (ref 9–23)
BUN/CREAT SERPL: 13 (ref 10–20)
CALCIUM BLD-MCNC: 9.7 MG/DL (ref 8.7–10.4)
CHLORIDE SERPL-SCNC: 108 MMOL/L (ref 98–112)
CHOLEST SERPL-MCNC: 167 MG/DL (ref ?–200)
CO2 SERPL-SCNC: 28 MMOL/L (ref 21–32)
CREAT BLD-MCNC: 0.92 MG/DL (ref 0.55–1.02)
DEPRECATED RDW RBC AUTO: 44.7 FL (ref 35.1–46.3)
EGFRCR SERPLBLD CKD-EPI 2021: 65 ML/MIN/1.73M2 (ref 60–?)
EOSINOPHIL # BLD AUTO: 0.15 X10(3) UL (ref 0–0.7)
EOSINOPHIL NFR BLD AUTO: 2 %
ERYTHROCYTE [DISTWIDTH] IN BLOOD BY AUTOMATED COUNT: 12.2 % (ref 11–15)
FASTING PATIENT LIPID ANSWER: YES
FASTING STATUS PATIENT QL REPORTED: YES
GLOBULIN PLAS-MCNC: 2.3 G/DL (ref 2–3.5)
GLUCOSE BLD-MCNC: 91 MG/DL (ref 70–99)
HCT VFR BLD AUTO: 41.3 % (ref 35–48)
HDLC SERPL-MCNC: 57 MG/DL (ref 40–59)
HGB BLD-MCNC: 13.3 G/DL (ref 12–16)
IMM GRANULOCYTES # BLD AUTO: 0.02 X10(3) UL (ref 0–1)
IMM GRANULOCYTES NFR BLD: 0.3 %
LDLC SERPL CALC-MCNC: 94 MG/DL (ref ?–100)
LYMPHOCYTES # BLD AUTO: 3.82 X10(3) UL (ref 1–4)
LYMPHOCYTES NFR BLD AUTO: 52.2 %
MCH RBC QN AUTO: 32 PG (ref 26–34)
MCHC RBC AUTO-ENTMCNC: 32.2 G/DL (ref 31–37)
MCV RBC AUTO: 99.3 FL (ref 80–100)
MONOCYTES # BLD AUTO: 0.37 X10(3) UL (ref 0.1–1)
MONOCYTES NFR BLD AUTO: 5.1 %
NEUTROPHILS # BLD AUTO: 2.89 X10 (3) UL (ref 1.5–7.7)
NEUTROPHILS # BLD AUTO: 2.89 X10(3) UL (ref 1.5–7.7)
NEUTROPHILS NFR BLD AUTO: 39.4 %
NONHDLC SERPL-MCNC: 110 MG/DL (ref ?–130)
OSMOLALITY SERPL CALC.SUM OF ELEC: 291 MOSM/KG (ref 275–295)
PLATELET # BLD AUTO: 266 10(3)UL (ref 150–450)
POTASSIUM SERPL-SCNC: 4.1 MMOL/L (ref 3.5–5.1)
PROT SERPL-MCNC: 7.1 G/DL (ref 5.7–8.2)
RBC # BLD AUTO: 4.16 X10(6)UL (ref 3.8–5.3)
SODIUM SERPL-SCNC: 141 MMOL/L (ref 136–145)
TRIGL SERPL-MCNC: 86 MG/DL (ref 30–149)
TSI SER-ACNC: 3.29 UIU/ML (ref 0.55–4.78)
VLDLC SERPL CALC-MCNC: 14 MG/DL (ref 0–30)
WBC # BLD AUTO: 7.3 X10(3) UL (ref 4–11)

## 2025-06-24 PROCEDURE — 80061 LIPID PANEL: CPT | Performed by: FAMILY MEDICINE

## 2025-06-24 PROCEDURE — 85025 COMPLETE CBC W/AUTO DIFF WBC: CPT | Performed by: FAMILY MEDICINE

## 2025-06-24 PROCEDURE — 80053 COMPREHEN METABOLIC PANEL: CPT | Performed by: FAMILY MEDICINE

## 2025-06-24 PROCEDURE — 36415 COLL VENOUS BLD VENIPUNCTURE: CPT | Performed by: FAMILY MEDICINE

## 2025-06-24 PROCEDURE — 84443 ASSAY THYROID STIM HORMONE: CPT | Performed by: FAMILY MEDICINE

## 2025-06-26 ENCOUNTER — OFFICE VISIT (OUTPATIENT)
Dept: FAMILY MEDICINE CLINIC | Facility: CLINIC | Age: 75
End: 2025-06-26
Payer: MEDICARE

## 2025-06-26 VITALS
DIASTOLIC BLOOD PRESSURE: 76 MMHG | HEART RATE: 73 BPM | WEIGHT: 132 LBS | OXYGEN SATURATION: 98 % | SYSTOLIC BLOOD PRESSURE: 128 MMHG | BODY MASS INDEX: 23 KG/M2

## 2025-06-26 DIAGNOSIS — Z00.00 ENCOUNTER FOR ANNUAL HEALTH EXAMINATION: Primary | ICD-10-CM

## 2025-06-26 PROCEDURE — G0439 PPPS, SUBSEQ VISIT: HCPCS | Performed by: FAMILY MEDICINE

## 2025-06-26 NOTE — PROGRESS NOTES
Subjective:   Fidelina Lombardo is a 74 year old female who presents for a Medicare Subsequent Annual Wellness visit (Pt already had Initial Annual Wellness) and scheduled follow up of multiple significant but stable problems.             Patient is a 74-year-old female presents today for annual Medicare physical.  All chronic illnesses stable  History/Other:   Fall Risk Assessment:   She has been screened for Falls and is low risk.      Cognitive Assessment:   She had a completely normal cognitive assessment - see flowsheet entries     Functional Ability/Status:   Fidelina Lombardo has a completely normal functional assessment. See flowsheet for details.      Depression Screening (PHQ):  PHQ-2 SCORE: 0  , done 6/23/2025   If you checked off any problems, how difficult have these problems made it for you to do your work, take care of things at home, or get along with other people?: Not difficult at all              Advanced Directives:   She has a Living Will on file in [facility]; reviewed and discussed documents with patient (and family/surrogate if present).  She has a Power of  for Health Care on file in [facility].  Not discussed      Patient Active Problem List   Diagnosis    Esophageal reflux    History of colon polyps    Family history of colon cancer    Osteoporosis    Mixed hyperlipidemia    Osteopenia of neck of femur    Vitreous degeneration    Vitreous floaters of left eye    Squamous blepharitis    Punctate keratitis     Allergies:  She is allergic to codeine, hydrocodone, and hydrocodone-acetaminophen.    Current Medications:  Outpatient Medications Marked as Taking for the 6/26/25 encounter (Office Visit) with Miriam Chaidez MD   Medication Sig    rosuvastatin 5 MG Oral Tab Take one tab po every other day    zolpidem 5 MG Oral Tab Take 1 tablet (5 mg total) by mouth nightly as needed for Sleep.    nortriptyline 10 MG Oral Cap Take 1 capsule (10 mg total) by mouth nightly.    dicyclomine 10 MG  Oral Cap Take 1 capsule (10 mg total) by mouth 4 (four) times daily as needed.    Calcium Carbonate Antacid (TUMS OR) Calcium Carbonate Oral Chewable        active    ZINC OR Use as directed 50 mg in the mouth or throat once a week.    Lactobacillus-Inulin (CULTURELLE DIGESTIVE HEALTH) Oral Cap     Omeprazole-Sodium Bicarbonate  MG Oral Cap Take 20 mg by mouth. Every other day     famoTIDine (PEPCID) 20 MG Oral Tab Take 1 tablet (20 mg total) by mouth daily.    Cyanocobalamin 3000 MCG Oral Cap NERVIDOX (unknown strength)    Coenzyme Q10 (CO Q-10) 200 MG Oral Cap Take  by mouth.    Cholecalciferol (VITAMIN D-3) 5000 UNITS Oral Tab Take 1 tablet (5,000 Units total) by mouth.    vitamin E 400 UNITS Oral Cap Take 1,000 Units by mouth daily.       Medical History:  She  has a past medical history of Deep vein thrombosis (HCC), Dysplasia of vagina (2015), Esophageal reflux, High cholesterol, History of blood clots, Osteoporosis, Other and unspecified hyperlipidemia, and PONV (postoperative nausea and vomiting).  Surgical History:  She  has a past surgical history that includes cholecystectomy (2011); colonoscopy (2012); upper gi endoscopy,biopsy (2014); colonoscopy (N/A, 6/28/2017); cataract (Left, 02/21/2019); cataract (Right, 10/17/2019); other surgical history (9/25/2012); and colonoscopy (N/A, 10/18/2022).   Family History:  Her family history includes Cancer in an other family member; Colon Cancer in her mother; Colon Cancer (age of onset: 66) in her sister; Heart Disease in her father and paternal grandmother; Hypertension in her mother; Other in her maternal grandfather, maternal grandmother, and paternal grandfather; Ovarian Cancer (age of onset: 69) in her paternal aunt.  Social History:  She  reports that she has never smoked. She has never used smokeless tobacco. She reports current alcohol use. She reports that she does not use drugs.    Tobacco:  She has never smoked tobacco.    CAGE Alcohol Screen:    CAGE screening score of 0 on 6/23/2025, showing low risk of alcohol abuse.      Patient Care Team:  Miriam Chaidez MD as PCP - General (Family Medicine)    Review of Systems  GENERAL: feels well otherwise  SKIN: denies any unusual skin lesions  EYES: denies blurred vision or double vision  HEENT: denies nasal congestion, sinus pain or ST  LUNGS: denies shortness of breath with exertion  CARDIOVASCULAR: denies chest pain on exertion  GI: denies abdominal pain, denies heartburn  : denies dysuria, vaginal discharge or itching, no complaint of urinary incontinence   MUSCULOSKELETAL: denies back pain  NEURO: denies headaches  PSYCHE: denies depression or anxiety  HEMATOLOGIC: denies hx of anemia  ENDOCRINE: denies thyroid history  ALL/ASTHMA: denies hx of allergy or asthma    Objective:   Physical Exam  General Appearance:  Alert, cooperative, no distress, appears stated age   Head:  Normocephalic, without obvious abnormality, atraumatic   Eyes:  PERRL, conjunctiva/corneas clear, EOM's intact both eyes   Ears:  Normal TM's and external ear canals, both ears   Nose: Nares normal, septum midline,mucosa normal, no drainage or sinus tenderness   Throat: Lips, mucosa, and tongue normal; teeth and gums normal   Neck: Supple, symmetrical, trachea midline, no adenopathy;  thyroid: not enlarged, symmetric, no tenderness/mass/nodules; no carotid bruit or JVD   Back:   Symmetric, no curvature, ROM normal, no CVA tenderness   Lungs:   Clear to auscultation bilaterally, respirations unlabored   Heart:  Regular rate and rhythm, S1 and S2 normal, no murmur, rub, or gallop   Abdomen:   Soft, non-tender, bowel sounds active all four quadrants,  no masses, no organomegaly   Pelvic: Deferred   Extremities: Extremities normal, atraumatic, no cyanosis or edema   Pulses: 2+ and symmetric   Skin: Skin color, texture, turgor normal, no rashes or lesions   Lymph nodes: Cervical, supraclavicular, and axillary nodes normal   Neurologic:  Normal       /76 (BP Location: Left arm, Patient Position: Sitting, Cuff Size: adult)   Pulse 73   Wt 132 lb (59.9 kg)   SpO2 98%   BMI 23.38 kg/m²  Estimated body mass index is 23.38 kg/m² as calculated from the following:    Height as of 5/29/25: 5' 3\" (1.6 m).    Weight as of this encounter: 132 lb (59.9 kg).    Medicare Hearing Assessment:   Hearing Screening    Screening Method: Questionnaire  I have a problem hearing over the telephone: No I have trouble following the conversations when two or more people are talking at the same time: No   I have trouble understanding things on the TV: No I have to strain to understand conversations: No   I have to worry about missing the telephone ring or doorbell: No I have trouble hearing conversations in a noisy background such as a crowded room or restaurant: No   I get confused about where sounds come from: No I misunderstand some words in a sentence and need to ask people to repeat themselves: No   I especially have trouble understanding the speech of women and children: No I have trouble understanding the speaker in a large room such as at a meeting or place of Bahai: No   Many people I talk to seem to mumble (or don't speak clearly): No People get annoyed because I misunderstand what they say: No   I misunderstand what others are saying and make inappropriate responses: No I avoid social activities because I cannot hear well and fear I will reply improperly: No   Family members and friends have told me they think I may have hearing loss: No             Visual Acuity:   Right Eye Visual Acuity: Uncorrected Right Eye Chart Acuity: 20/30   Left Eye Visual Acuity: Corrected Left Eye Chart Acuity: 20/30   Both Eyes Visual Acuity: Corrected Both Eyes Chart Acuity: 20/30   Able To Tolerate Visual Acuity: Yes        Assessment & Plan:   Fidelina Lombardo is a 74 year old female who presents for a Medicare Assessment.     1. Encounter for annual health  examination (Primary)            The patient indicates understanding of these issues and agrees to the plan.  Reinforced healthy diet, lifestyle, and exercise.      Return in 6 months (on 12/26/2025).     Miriam Chaidez MD, 6/26/2025     Supplementary Documentation:   General Health:  In the past six months, have you lost more than 10 pounds without trying?: (Patient-Rptd) 2 - No  Has your appetite been poor?: (Patient-Rptd) No  Type of Diet: (Patient-Rptd) Balanced  How does the patient maintain a good energy level?: (Patient-Rptd) Appropriate Exercise  How would you describe your daily physical activity?: (Patient-Rptd) Moderate  How would you describe your current health state?: (Patient-Rptd) Good  How do you maintain positive mental well-being?: (Patient-Rptd) Social Interaction, Games, Visiting Friends, Visiting Family  On a scale of 0 to 10, with 0 being no pain and 10 being severe pain, what is your pain level?: (Patient-Rptd) 1 - (Mild)  In the past six months, have you experienced urine leakage?: (Patient-Rptd) 0-No  At any time do you feel concerned for the safety/well-being of yourself and/or your children, in your home or elsewhere?: (Patient-Rptd) No  Have you had any immunizations at another office such as Influenza, Hepatitis B, Tetanus, or Pneumococcal?: (Patient-Rptd) No    Health Maintenance   Topic Date Due    COVID-19 Vaccine (8 - 2024-25 season) 03/13/2025    Annual Physical  06/26/2026    Colorectal Cancer Screening  10/18/2027    Influenza Vaccine  Completed    DEXA Scan  Completed    Annual Depression Screening  Completed    Fall Risk Screening (Annual)  Completed    Pneumococcal Vaccine: 50+ Years  Completed    Zoster Vaccines  Completed    Meningococcal B Vaccine  Aged Out    Mammogram  Discontinued

## 2025-07-03 ENCOUNTER — HOSPITAL ENCOUNTER (OUTPATIENT)
Dept: ULTRASOUND IMAGING | Facility: HOSPITAL | Age: 75
Discharge: HOME | End: 2025-07-03
Attending: PHYSICIAN ASSISTANT
Payer: MEDICARE

## 2025-07-03 DIAGNOSIS — Z80.0 FAMILY HISTORY OF COLON CANCER: ICD-10-CM

## 2025-07-03 DIAGNOSIS — R10.2 ACUTE PELVIC PAIN: ICD-10-CM

## 2025-07-03 PROCEDURE — 76856 US EXAM PELVIC COMPLETE: CPT | Performed by: PHYSICIAN ASSISTANT

## 2025-07-03 RX ORDER — NORTRIPTYLINE HYDROCHLORIDE 10 MG/1
10 CAPSULE ORAL NIGHTLY
Qty: 90 CAPSULE | Refills: 0 | Status: SHIPPED | OUTPATIENT
Start: 2025-07-03

## 2025-07-03 NOTE — TELEPHONE ENCOUNTER
Requested Prescriptions     Pending Prescriptions Disp Refills    NORTRIPTYLINE 10 MG Oral Cap [Pharmacy Med Name: nortriptyline 10 mg capsule] 90 capsule 3     Sig: TAKE 1 Capsule BY MOUTH ONCE A DAY AT BEDTIME         LOV   7/22/2024      LR    4/26/2024      AK

## 2025-07-28 ENCOUNTER — OFFICE VISIT (OUTPATIENT)
Facility: CLINIC | Age: 75
End: 2025-07-28
Payer: MEDICARE

## 2025-07-28 VITALS
HEIGHT: 63 IN | BODY MASS INDEX: 23.74 KG/M2 | DIASTOLIC BLOOD PRESSURE: 74 MMHG | WEIGHT: 134 LBS | SYSTOLIC BLOOD PRESSURE: 129 MMHG | HEART RATE: 76 BPM

## 2025-07-28 DIAGNOSIS — K58.0 IRRITABLE BOWEL SYNDROME WITH DIARRHEA: ICD-10-CM

## 2025-07-28 DIAGNOSIS — K21.9 GASTROESOPHAGEAL REFLUX DISEASE WITHOUT ESOPHAGITIS: Primary | ICD-10-CM

## 2025-07-28 DIAGNOSIS — Z80.0 FAMILY HISTORY OF COLON CANCER: ICD-10-CM

## 2025-07-28 DIAGNOSIS — R10.9 ABDOMINAL PAIN, UNSPECIFIED ABDOMINAL LOCATION: ICD-10-CM

## 2025-07-28 DIAGNOSIS — K31.7 GASTRIC POLYPS: ICD-10-CM

## 2025-07-28 PROCEDURE — 99213 OFFICE O/P EST LOW 20 MIN: CPT | Performed by: INTERNAL MEDICINE

## (undated) DIAGNOSIS — Z80.0 FAMILY HISTORY OF COLON CANCER: ICD-10-CM

## (undated) DIAGNOSIS — Z86.010 PERSONAL HISTORY OF COLONIC POLYPS: Primary | ICD-10-CM

## (undated) DEVICE — Device

## (undated) DEVICE — MEDI-VAC NON-CONDUCTIVE SUCTION TUBING 6MM X 1.8M (6FT.) L: Brand: CARDINAL HEALTH

## (undated) DEVICE — LINE MNTR ADLT SET O2 INTMD

## (undated) DEVICE — SNARE ENDOSCOPIC 10MM ROUND

## (undated) DEVICE — FORCEP RADIAL JAW 4

## (undated) DEVICE — 6 ML SYRINGE LUER-LOCK TIP: Brand: MONOJECT

## (undated) DEVICE — SNARE OPTMZ PLPCTM TRP

## (undated) DEVICE — CLIP LGT 11MM OPEN 2.8MM 235CM

## (undated) DEVICE — 35 ML SYRINGE REGULAR TIP: Brand: MONOJECT

## (undated) DEVICE — ENDOSCOPY PACK - LOWER: Brand: MEDLINE INDUSTRIES, INC.

## (undated) DEVICE — SNARE CAPTIFLEX MICRO-OVL OLY

## (undated) DEVICE — CONMED SCOPE SAVER BITE BLOCK, 20X27 MM: Brand: SCOPE SAVER

## (undated) DEVICE — DURACLIP 11MM 235CM

## (undated) DEVICE — KIT CLEAN ENDOKIT 1.1OZ GOWNX2

## (undated) DEVICE — 3 ML SYRINGE LUER-LOCK TIP: Brand: MONOJECT

## (undated) DEVICE — Device: Brand: CUSTOM PROCEDURE KIT

## (undated) DEVICE — TRAP 4 CPTR CHMBR N EZ INLN

## (undated) DEVICE — Device: Brand: DEFENDO AIR/WATER/SUCTION AND BIOPSY VALVE

## (undated) DEVICE — KIT ENDO ORCAPOD 160/180/190

## (undated) NOTE — LETTER
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 Javier Mina and myself. She has a long standing gastrointestinal history of multiple problems including abdominal pain and fecal urgency.  This has been well managed w

## (undated) NOTE — LETTER
[address]  Authorization for Surgical Operation and Procedure                                                                                           1. I hereby Lexgenesis Pearl MD, my physician and his/her assistants (if applicable), which may include medical students, residents, and/or fellows, to perform the following surgical operation/ procedure and administer such anesthesia as may be determined necessary by my physician: Operation/Procedure name (s) COLONOSCOPY on [address]   2. I recognize that during the surgical operation/procedure, unforeseen conditions may necessitate additional or different procedures than those listed above. I, therefore, further authorize and request that the above-named surgeon, assistants, or designees perform such procedures as are, in their judgment, necessary and desirable. 3.   My surgeon/physician has discussed prior to my surgery the potential benefits, risks and side effects of this procedure; the likelihood of achieving goals; and potential problems that might occur during recuperation. They also discussed reasonable alternatives to the procedure, including risks, benefits, and side effects related to the alternatives and risks related to not receiving this procedure. I have had all my questions answered and I acknowledge that no guarantee has been made as to the result that may be obtained. 4.   Should the need arise during my operation/procedure, which includes change of level of care prior to discharge, I also consent to the administration of blood and/or blood products. Further, I understand that despite careful testing and screening of blood or blood products by collecting agencies, I may still be subject to ill effects as a result of receiving a blood transfusion and/or blood products.   The following are some, but not all, of the potential risks that can occur: fever and allergic reactions, hemolytic reactions, transmission of diseases such as Hepatitis, AIDS and Cytomegalovirus (CMV) and fluid overload. In the event that I wish to have an autologous transfusion of my own blood, or a directed donor transfusion, I will discuss this with my physician. Check only if Refusing Blood or Blood Products  I understand refusal of blood or blood products as deemed necessary by my physician may have serious consequences to my condition to include possible death. I hereby assume responsibility for my refusal and release the hospital, its personnel, and my physicians from any responsibility for the consequences of my refusal.           ____ Refuse      5. I authorize the use of any specimen, organs, tissues, body parts or foreign objects that may be removed from my body during the operation/procedure for diagnosis, research or teaching purposes and their subsequent disposal by hospital authorities. I also authorize the release of specimen test results and/or written reports to my treating physician on the hospital medical staff or other referring or consulting physicians involved in my care, at the discretion of the Pathologist or my treating physician. 6.   I consent to the photographing or videotaping of the operations or procedures to be performed, including appropriate portions of my body for medical, scientific, or educational purposes, provided my identity is not revealed by the pictures or by descriptive texts accompanying them. If the procedure has been photographed/videotaped, the surgeon will obtain the original picture, image, videotape or CD. The hospital will not be responsible for storage, release or maintenance of the picture, image, tape or CD.    7.   I consent to the presence of a  or observers in the operating room as deemed necessary by my physician or their designees.     8.   I recognize that in the event my procedure results in extended X-Ray/fluoroscopy time, I may develop a skin reaction. 9. If I have a Do Not Attempt Resuscitation (DNAR) order in place, that status will be suspended while in the operating room, procedural suite, and during the recovery period unless otherwise explicitly stated by me (or a person authorized to consent on my behalf). The surgeon or my attending physician will determine when the applicable recovery period ends for purposes of reinstating the DNAR order. 10. Patients having a sterilization procedure: I understand that if the procedure is successful the results will be permanent and it will therefore be impossible for me to inseminate, conceive, or bear children. I also understand that the procedure is intended to result in sterility, although the result has not been guaranteed. 11. I acknowledge that my physician has explained sedation/analgesia administration to me including the risk and benefits I consent to the administration of sedation/analgesia as may be necessary or desirable in the judgment of my physician. I CERTIFY THAT I HAVE READ AND FULLY UNDERSTAND THE ABOVE CONSENT TO OPERATION and/or OTHER PROCEDURE.     _________________________________________ _________________________________     ___________________________________  Signature of Patient     Signature of Responsible Person                   Printed Name of Responsible Person                              _________________________________________ ______________________________        ___________________________________  Signature of Witness         Date  Time         Relationship to Patient    STATEMENT OF PHYSICIAN My signature below affirms that prior to the time of the procedure; I have explained to the patient and/or his/her legal representative, the risks and benefits involved in the proposed treatment and any reasonable alternative to the proposed treatment.  I have also explained the risks and benefits involved in refusal of the proposed treatment and alternatives to the proposed treatment and have answered the patient's questions.  If I have a significant financial interest in a co-management agreement or a significant financial interest in any product or implant, or other significant relationship used in this procedure/surgery, I have disclosed this and had a discussion with my patient.     _______________________________________________________________ _____________________________  (Signature of Physician)                                                                                         (Date)                                   (Time)  Patient Name: Julio Cesar Quispe    : 1950   Printed: 10/17/2022      Medical Record #: [id number]                                              Page 1 of 1

## (undated) NOTE — MR AVS SNAPSHOT
[facility]  [address] [place] [address] [phone]               Thank you for choosing us for your health care visit with Chely Quinteros MD.  We are glad to serve you and happy to provide you with this summary of your vis Current Medications          This list is accurate as of: 5/15/17  5:31 PM.  Always use your most recent med list.                ALIGN Chew   Chew 1 capsule by mouth.            atorvastatin 10 MG Tabs   TK 1 T PO QD   Commonly known as:  LIPITOR Lifestyle Modification Recommendations:    Modification Recommendation   Weight Reduction Maintain normal body weight (body mass index 18.5-24.9 kg/m2)   DASH eating plan Adopt a diet rich in fruits, vegetables, and low fat dairy products with reduced cont

## (undated) NOTE — LETTER
11/15/21      Patient: Marilee Cho  : 1950 Visit date: 11/15/2021    Dear Waqas Carvajal,      I examined your patient in follow-up today. She is 5 weeks post nondisplaced fracture of the left small finger distal phalanx.   She was kate

## (undated) NOTE — LETTER
[address]  Authorization for Invasive Procedure                                                                                           1. I hereby authorize Yunior Sauer MD, my physician and his/her assistants (if applicable), which may include medical students, residents, and/or fellows, to perform the following surgical operation/ procedure and administer such anesthesia as may be determined necessary by my physician: Operation/Procedure name (s) COLONOSCOPY on [address]   2. I recognize that during the surgical operation/procedure, unforeseen conditions may necessitate additional or different procedures than those listed above. I, therefore, further authorize and request that the above-named surgeon, assistants, or designees perform such procedures as are, in their judgment, necessary and desirable. 3.   My surgeon/physician has discussed prior to my surgery the potential benefits, risks and side effects of this procedure; the likelihood of achieving goals; and potential problems that might occur during recuperation. They also discussed reasonable alternatives to the procedure, including risks, benefits, and side effects related to the alternatives and risks related to not receiving this procedure. I have had all my questions answered and I acknowledge that no guarantee has been made as to the result that may be obtained. 4.   Should the need arise during my operation/procedure, which includes change of level of care prior to discharge, I also consent to the administration of blood and/or blood products. Further, I understand that despite careful testing and screening of blood or blood products by collecting agencies, I may still be subject to ill effects as a result of receiving a blood transfusion and/or blood products.   The following are some, but not all, of the potential risks that can occur: fever and allergic reactions, hemolytic reactions, transmission of diseases such as Hepatitis, AIDS and Cytomegalovirus (CMV) and fluid overload. In the event that I wish to have an autologous transfusion of my own blood, or a directed donor transfusion, I will discuss this with my physician. Check only if Refusing Blood or Blood Products  I understand refusal of blood or blood products as deemed necessary by my physician may have serious consequences to my condition to include possible death. I hereby assume responsibility for my refusal and release the hospital, its personnel, and my physicians from any responsibility for the consequences of my refusal.           ____ Refuse      5. I authorize the use of any specimen, organs, tissues, body parts or foreign objects that may be removed from my body during the operation/procedure for diagnosis, research or teaching purposes and their subsequent disposal by hospital authorities. I also authorize the release of specimen test results and/or written reports to my treating physician on the hospital medical staff or other referring or consulting physicians involved in my care, at the discretion of the Pathologist or my treating physician. 6.   I consent to the photographing or videotaping of the operations or procedures to be performed, including appropriate portions of my body for medical, scientific, or educational purposes, provided my identity is not revealed by the pictures or by descriptive texts accompanying them. If the procedure has been photographed/videotaped, the surgeon will obtain the original picture, image, videotape or CD. The hospital will not be responsible for storage, release or maintenance of the picture, image, tape or CD.    7.   I consent to the presence of a  or observers in the operating room as deemed necessary by my physician or their designees.     8.   I recognize that in the event my procedure results in extended X-Ray/fluoroscopy time, I may develop a skin reaction. 9. If I have a Do Not Attempt Resuscitation (DNAR) order in place, that status will be suspended while in the operating room, procedural suite, and during the recovery period unless otherwise explicitly stated by me (or a person authorized to consent on my behalf). The surgeon or my attending physician will determine when the applicable recovery period ends for purposes of reinstating the DNAR order. 10. Patients having a sterilization procedure: I understand that if the procedure is successful the results will be permanent and it will therefore be impossible for me to inseminate, conceive, or bear children. I also understand that the procedure is intended to result in sterility, although the result has not been guaranteed. 11. I acknowledge that my physician has explained sedation/analgesia administration to me including the risk and benefits I consent to the administration of sedation/analgesia as may be necessary or desirable in the judgment of my physician. I CERTIFY THAT I HAVE READ AND FULLY UNDERSTAND THE ABOVE CONSENT TO OPERATION and/or OTHER PROCEDURE.     _________________________________________ _________________________________     ___________________________________  Signature of Patient     Signature of Responsible Person                   Printed Name of Responsible Person                              _________________________________________ ______________________________        ___________________________________  Signature of Witness         Date  Time         Relationship to Patient    STATEMENT OF PHYSICIAN My signature below affirms that prior to the time of the procedure; I have explained to the patient and/or his/her legal representative, the risks and benefits involved in the proposed treatment and any reasonable alternative to the proposed treatment.  I have also explained the risks and benefits involved in refusal of the proposed treatment and alternatives to the proposed treatment and have answered the patient's questions.  If I have a significant financial interest in a co-management agreement or a significant financial interest in any product or implant, or other significant relationship used in this procedure/surgery, I have disclosed this and had a discussion with my patient.     _______________________________________________________________ _____________________________  (Signature of Physician)                                                                                         (Date)                                   (Time)  Patient Name: Sushma Grimaldo    : 1950   Printed: 10/17/2022      Medical Record #: [id number]                                              Page 1 of 1

## (undated) NOTE — LETTER
10/14/21      Patient: Estefany Cameron  : 1950 Visit date: 10/14/2021    Dear Britney Butt,      I examined your patient in consultation today. She has a nondisplaced transverse fracture of the distal phalanx of the left small finger.   Jose Antonio Gomez